# Patient Record
Sex: FEMALE | Race: WHITE | NOT HISPANIC OR LATINO | Employment: FULL TIME | URBAN - METROPOLITAN AREA
[De-identification: names, ages, dates, MRNs, and addresses within clinical notes are randomized per-mention and may not be internally consistent; named-entity substitution may affect disease eponyms.]

---

## 2017-03-27 PROBLEM — O14.03 MILD PRE-ECLAMPSIA IN THIRD TRIMESTER: Status: ACTIVE | Noted: 2017-03-27

## 2017-03-27 PROBLEM — O34.219 DECLINES VAGINAL BIRTH AFTER CESAREAN TRIAL: Status: ACTIVE | Noted: 2017-03-27

## 2017-06-26 PROBLEM — O14.03 MILD PRE-ECLAMPSIA IN THIRD TRIMESTER: Status: RESOLVED | Noted: 2017-03-27 | Resolved: 2017-06-26

## 2018-11-29 PROBLEM — O34.219 DECLINES VAGINAL BIRTH AFTER CESAREAN TRIAL: Status: RESOLVED | Noted: 2017-03-27 | Resolved: 2018-11-29

## 2020-02-28 PROBLEM — E66.811 CLASS 1 OBESITY DUE TO EXCESS CALORIES WITHOUT SERIOUS COMORBIDITY WITH BODY MASS INDEX (BMI) OF 30.0 TO 30.9 IN ADULT: Status: ACTIVE | Noted: 2020-02-28

## 2020-02-28 PROBLEM — E66.09 CLASS 1 OBESITY DUE TO EXCESS CALORIES WITHOUT SERIOUS COMORBIDITY WITH BODY MASS INDEX (BMI) OF 30.0 TO 30.9 IN ADULT: Status: ACTIVE | Noted: 2020-02-28

## 2022-02-23 PROBLEM — F41.8 DEPRESSION WITH ANXIETY: Status: ACTIVE | Noted: 2022-02-23

## 2022-12-24 PROBLEM — H02.403 PTOSIS, BILATERAL: Status: ACTIVE | Noted: 2022-12-24

## 2022-12-25 ENCOUNTER — HOSPITAL ENCOUNTER (INPATIENT)
Facility: HOSPITAL | Age: 37
LOS: 5 days | Discharge: NURSING, REHAB OR LONG-TERM CARE | DRG: 064 | End: 2022-12-30
Attending: EMERGENCY MEDICINE | Admitting: PSYCHIATRY & NEUROLOGY
Payer: COMMERCIAL

## 2022-12-25 DIAGNOSIS — I63.9 STROKE: ICD-10-CM

## 2022-12-25 DIAGNOSIS — I77.74 VERTEBRAL ARTERY DISSECTION: Primary | ICD-10-CM

## 2022-12-25 DIAGNOSIS — H53.2 DIPLOPIA: ICD-10-CM

## 2022-12-25 DIAGNOSIS — I63.9 CEREBROVASCULAR ACCIDENT (CVA) OF LEFT PONTINE STRUCTURE: ICD-10-CM

## 2022-12-25 DIAGNOSIS — H02.403 PTOSIS, BILATERAL: ICD-10-CM

## 2022-12-25 DIAGNOSIS — I63.9 NEW INFARCTION OF CEREBELLUM: ICD-10-CM

## 2022-12-25 PROBLEM — E66.09 CLASS 2 OBESITY DUE TO EXCESS CALORIES WITH BODY MASS INDEX (BMI) OF 37.0 TO 37.9 IN ADULT: Status: ACTIVE | Noted: 2022-12-25

## 2022-12-25 PROBLEM — E66.812 CLASS 2 OBESITY DUE TO EXCESS CALORIES WITH BODY MASS INDEX (BMI) OF 37.0 TO 37.9 IN ADULT: Status: ACTIVE | Noted: 2022-12-25

## 2022-12-25 LAB
APTT BLDCRRT: 25 SEC (ref 21–32)
ASCENDING AORTA: 2.69 CM
AV INDEX (PROSTH): 0.86
AV MEAN GRADIENT: 5 MMHG
AV PEAK GRADIENT: 8 MMHG
AV VALVE AREA: 2.45 CM2
AV VELOCITY RATIO: 0.87
BSA FOR ECHO PROCEDURE: 1.99 M2
CHOLEST SERPL-MCNC: 179 MG/DL (ref 120–199)
CHOLEST/HDLC SERPL: 3.2 {RATIO} (ref 2–5)
CK MB SERPL-MCNC: 1 NG/ML (ref 0.1–6.5)
CK MB SERPL-RTO: 0.6 % (ref 0–5)
CK SERPL-CCNC: 159 U/L (ref 20–180)
CV ECHO LV RWT: 0.22 CM
DOP CALC AO PEAK VEL: 1.37 M/S
DOP CALC AO VTI: 29.12 CM
DOP CALC LVOT AREA: 2.8 CM2
DOP CALC LVOT DIAMETER: 1.9 CM
DOP CALC LVOT PEAK VEL: 1.19 M/S
DOP CALC LVOT STROKE VOLUME: 71.3 CM3
DOP CALCLVOT PEAK VEL VTI: 25.16 CM
E WAVE DECELERATION TIME: 122.61 MSEC
E/A RATIO: 1.63
E/E' RATIO: 6.52 M/S
ECHO LV POSTERIOR WALL: 0.53 CM (ref 0.6–1.1)
EJECTION FRACTION: 65 %
ESTIMATED AVG GLUCOSE: 91 MG/DL (ref 68–131)
FRACTIONAL SHORTENING: 42 % (ref 28–44)
HBA1C MFR BLD: 4.8 % (ref 4–5.6)
HDLC SERPL-MCNC: 56 MG/DL (ref 40–75)
HDLC SERPL: 31.3 % (ref 20–50)
INR PPP: 1 (ref 0.8–1.2)
INTERVENTRICULAR SEPTUM: 0.7 CM (ref 0.6–1.1)
IVRT: 102.76 MSEC
LA MAJOR: 4.69 CM
LA MINOR: 4.81 CM
LA WIDTH: 3.08 CM
LDLC SERPL CALC-MCNC: 109.4 MG/DL (ref 63–159)
LEFT ATRIUM SIZE: 3.67 CM
LEFT ATRIUM VOLUME INDEX MOD: 17.1 ML/M2
LEFT ATRIUM VOLUME INDEX: 24 ML/M2
LEFT ATRIUM VOLUME MOD: 32.51 CM3
LEFT ATRIUM VOLUME: 45.63 CM3
LEFT INTERNAL DIMENSION IN SYSTOLE: 2.79 CM (ref 2.1–4)
LEFT VENTRICLE DIASTOLIC VOLUME INDEX: 56.12 ML/M2
LEFT VENTRICLE DIASTOLIC VOLUME: 106.62 ML
LEFT VENTRICLE MASS INDEX: 48 G/M2
LEFT VENTRICLE SYSTOLIC VOLUME INDEX: 15.4 ML/M2
LEFT VENTRICLE SYSTOLIC VOLUME: 29.2 ML
LEFT VENTRICULAR INTERNAL DIMENSION IN DIASTOLE: 4.78 CM (ref 3.5–6)
LEFT VENTRICULAR MASS: 90.34 G
LV LATERAL E/E' RATIO: 6.77 M/S
LV SEPTAL E/E' RATIO: 6.29 M/S
MV PEAK A VEL: 0.54 M/S
MV PEAK E VEL: 0.88 M/S
MV STENOSIS PRESSURE HALF TIME: 35.56 MS
MV VALVE AREA P 1/2 METHOD: 6.19 CM2
NONHDLC SERPL-MCNC: 123 MG/DL
PISA TR MAX VEL: 2.44 M/S
PROTHROMBIN TIME: 10.3 SEC (ref 9–12.5)
RA MAJOR: 4.34 CM
RA PRESSURE: 3 MMHG
RA WIDTH: 2.77 CM
RIGHT VENTRICULAR END-DIASTOLIC DIMENSION: 2.64 CM
RV TISSUE DOPPLER FREE WALL SYSTOLIC VELOCITY 1 (APICAL 4 CHAMBER VIEW): 16.54 CM/S
SINUS: 2.6 CM
STJ: 2.06 CM
TDI LATERAL: 0.13 M/S
TDI SEPTAL: 0.14 M/S
TDI: 0.14 M/S
TR MAX PG: 24 MMHG
TRICUSPID ANNULAR PLANE SYSTOLIC EXCURSION: 1.98 CM
TRIGL SERPL-MCNC: 68 MG/DL (ref 30–150)
TROPONIN I SERPL DL<=0.01 NG/ML-MCNC: <0.006 NG/ML (ref 0–0.03)
TSH SERPL DL<=0.005 MIU/L-ACNC: 1.32 UIU/ML (ref 0.4–4)
TV REST PULMONARY ARTERY PRESSURE: 27 MMHG

## 2022-12-25 PROCEDURE — 25000003 PHARM REV CODE 250: Performed by: NURSE PRACTITIONER

## 2022-12-25 PROCEDURE — 25000003 PHARM REV CODE 250: Performed by: PSYCHIATRY & NEUROLOGY

## 2022-12-25 PROCEDURE — 97530 THERAPEUTIC ACTIVITIES: CPT

## 2022-12-25 PROCEDURE — 99291 CRITICAL CARE FIRST HOUR: CPT | Mod: ,,, | Performed by: EMERGENCY MEDICINE

## 2022-12-25 PROCEDURE — 97535 SELF CARE MNGMENT TRAINING: CPT

## 2022-12-25 PROCEDURE — 85610 PROTHROMBIN TIME: CPT | Performed by: NURSE PRACTITIONER

## 2022-12-25 PROCEDURE — 25500020 PHARM REV CODE 255: Performed by: EMERGENCY MEDICINE

## 2022-12-25 PROCEDURE — 92610 EVALUATE SWALLOWING FUNCTION: CPT

## 2022-12-25 PROCEDURE — 99223 PR INITIAL HOSPITAL CARE,LEVL III: ICD-10-PCS | Mod: ,,, | Performed by: PSYCHIATRY & NEUROLOGY

## 2022-12-25 PROCEDURE — 99291 PR CRITICAL CARE, E/M 30-74 MINUTES: ICD-10-PCS | Mod: ,,, | Performed by: EMERGENCY MEDICINE

## 2022-12-25 PROCEDURE — 85730 THROMBOPLASTIN TIME PARTIAL: CPT | Performed by: NURSE PRACTITIONER

## 2022-12-25 PROCEDURE — 99223 1ST HOSP IP/OBS HIGH 75: CPT | Mod: ,,, | Performed by: PSYCHIATRY & NEUROLOGY

## 2022-12-25 PROCEDURE — 80061 LIPID PANEL: CPT | Performed by: NURSE PRACTITIONER

## 2022-12-25 PROCEDURE — 97165 OT EVAL LOW COMPLEX 30 MIN: CPT

## 2022-12-25 PROCEDURE — 99285 EMERGENCY DEPT VISIT HI MDM: CPT | Mod: 25

## 2022-12-25 PROCEDURE — 82553 CREATINE MB FRACTION: CPT | Performed by: NURSE PRACTITIONER

## 2022-12-25 PROCEDURE — 84484 ASSAY OF TROPONIN QUANT: CPT | Performed by: NURSE PRACTITIONER

## 2022-12-25 PROCEDURE — 63600175 PHARM REV CODE 636 W HCPCS: Performed by: NURSE PRACTITIONER

## 2022-12-25 PROCEDURE — 94761 N-INVAS EAR/PLS OXIMETRY MLT: CPT

## 2022-12-25 PROCEDURE — 84443 ASSAY THYROID STIM HORMONE: CPT | Performed by: NURSE PRACTITIONER

## 2022-12-25 PROCEDURE — 11000001 HC ACUTE MED/SURG PRIVATE ROOM

## 2022-12-25 PROCEDURE — 83036 HEMOGLOBIN GLYCOSYLATED A1C: CPT | Performed by: NURSE PRACTITIONER

## 2022-12-25 RX ORDER — LABETALOL HYDROCHLORIDE 5 MG/ML
10 INJECTION, SOLUTION INTRAVENOUS EVERY 6 HOURS PRN
Status: DISCONTINUED | OUTPATIENT
Start: 2022-12-25 | End: 2022-12-26

## 2022-12-25 RX ORDER — ALPRAZOLAM 0.25 MG/1
0.5 TABLET ORAL NIGHTLY PRN
Status: DISCONTINUED | OUTPATIENT
Start: 2022-12-25 | End: 2022-12-30 | Stop reason: HOSPADM

## 2022-12-25 RX ORDER — SODIUM CHLORIDE 0.9 % (FLUSH) 0.9 %
10 SYRINGE (ML) INJECTION
Status: DISCONTINUED | OUTPATIENT
Start: 2022-12-25 | End: 2022-12-30 | Stop reason: HOSPADM

## 2022-12-25 RX ORDER — ATORVASTATIN CALCIUM 20 MG/1
40 TABLET, FILM COATED ORAL DAILY
Status: DISCONTINUED | OUTPATIENT
Start: 2022-12-25 | End: 2022-12-30 | Stop reason: HOSPADM

## 2022-12-25 RX ORDER — ASPIRIN 81 MG/1
81 TABLET ORAL DAILY
Status: DISCONTINUED | OUTPATIENT
Start: 2022-12-25 | End: 2022-12-30 | Stop reason: HOSPADM

## 2022-12-25 RX ORDER — ENOXAPARIN SODIUM 100 MG/ML
40 INJECTION SUBCUTANEOUS EVERY 24 HOURS
Status: DISCONTINUED | OUTPATIENT
Start: 2022-12-25 | End: 2022-12-30 | Stop reason: HOSPADM

## 2022-12-25 RX ORDER — KETOROLAC TROMETHAMINE 30 MG/ML
10 INJECTION, SOLUTION INTRAMUSCULAR; INTRAVENOUS
Status: ACTIVE | OUTPATIENT
Start: 2022-12-25 | End: 2022-12-25

## 2022-12-25 RX ORDER — ACETAMINOPHEN 325 MG/1
650 TABLET ORAL EVERY 6 HOURS PRN
Status: DISCONTINUED | OUTPATIENT
Start: 2022-12-25 | End: 2022-12-30 | Stop reason: HOSPADM

## 2022-12-25 RX ORDER — SODIUM CHLORIDE 9 MG/ML
INJECTION, SOLUTION INTRAVENOUS CONTINUOUS
Status: DISCONTINUED | OUTPATIENT
Start: 2022-12-25 | End: 2022-12-29

## 2022-12-25 RX ORDER — ONDANSETRON 2 MG/ML
4 INJECTION INTRAMUSCULAR; INTRAVENOUS EVERY 12 HOURS PRN
Status: DISCONTINUED | OUTPATIENT
Start: 2022-12-25 | End: 2022-12-30 | Stop reason: HOSPADM

## 2022-12-25 RX ORDER — MUPIROCIN 20 MG/G
OINTMENT TOPICAL 2 TIMES DAILY
Status: COMPLETED | OUTPATIENT
Start: 2022-12-25 | End: 2022-12-29

## 2022-12-25 RX ORDER — MAGNESIUM SULFATE HEPTAHYDRATE 40 MG/ML
2 INJECTION, SOLUTION INTRAVENOUS ONCE
Status: DISCONTINUED | OUTPATIENT
Start: 2022-12-25 | End: 2022-12-28

## 2022-12-25 RX ORDER — METHOCARBAMOL 500 MG/1
500 TABLET, FILM COATED ORAL 3 TIMES DAILY PRN
Status: DISCONTINUED | OUTPATIENT
Start: 2022-12-25 | End: 2022-12-30 | Stop reason: HOSPADM

## 2022-12-25 RX ADMIN — SODIUM CHLORIDE: 9 INJECTION, SOLUTION INTRAVENOUS at 08:12

## 2022-12-25 RX ADMIN — MUPIROCIN: 20 OINTMENT TOPICAL at 11:12

## 2022-12-25 RX ADMIN — SODIUM CHLORIDE: 9 INJECTION, SOLUTION INTRAVENOUS at 06:12

## 2022-12-25 RX ADMIN — MUPIROCIN: 20 OINTMENT TOPICAL at 08:12

## 2022-12-25 RX ADMIN — ENOXAPARIN SODIUM 40 MG: 40 INJECTION SUBCUTANEOUS at 05:12

## 2022-12-25 RX ADMIN — IOHEXOL 100 ML: 350 INJECTION, SOLUTION INTRAVENOUS at 01:12

## 2022-12-25 RX ADMIN — ASPIRIN 81 MG: 81 TABLET, COATED ORAL at 03:12

## 2022-12-25 NOTE — SUBJECTIVE & OBJECTIVE
Past Medical History:   Diagnosis Date    Migraine headache      Past Surgical History:   Procedure Laterality Date     SECTION  2006    MANDIBLE FRACTURE SURGERY       Family History   Problem Relation Age of Onset    Ovarian cancer Mother 34    Hyperlipidemia Father     Ovarian cancer Maternal Aunt         age unknown    Breast cancer Paternal Grandmother         50's     Social History     Tobacco Use    Smoking status: Former     Types: Cigarettes     Quit date: 2013     Years since quittin.4    Smokeless tobacco: Never   Substance Use Topics    Alcohol use: Yes     Alcohol/week: 0.0 standard drinks     Comment: 2 time per week    Drug use: No     Review of patient's allergies indicates:   Allergen Reactions    Promethazine Other (See Comments)     Nervous reaction       Medications: I have reviewed the current medication administration record.    Medications Prior to Admission   Medication Sig Dispense Refill Last Dose    butalbital-acetaminophen-caffeine -40 mg (FIORICET, ESGIC) -40 mg per tablet Take 1 tablet by mouth every 12 (twelve) hours as needed for Pain or Headaches. 10 tablet 1     ondansetron (ZOFRAN-ODT) 8 MG TbDL LET 1 TABLET DISSOLVE TWICE A DAY AS NEEDED FOR NAUSEA 20 tablet 1        Review of Systems   Constitutional:  Negative for chills and fever.   HENT:  Negative for drooling and ear discharge.    Eyes:  Positive for visual disturbance. Negative for pain.   Respiratory:  Negative for cough and shortness of breath.    Cardiovascular:  Negative for chest pain and leg swelling.   Gastrointestinal:  Negative for abdominal distention and abdominal pain.   Endocrine: Negative for polydipsia and polyphagia.   Genitourinary:  Negative for dysuria and enuresis.   Musculoskeletal:  Negative for arthralgias and back pain.   Skin:  Negative for rash and wound.   Allergic/Immunologic: Negative for food allergies and immunocompromised state.   Neurological:  Positive for  speech difficulty, numbness and headaches.   Hematological:  Does not bruise/bleed easily.   Psychiatric/Behavioral:  Negative for agitation and confusion.    Objective:     Vital Signs (Most Recent):  Temp: 98.2 °F (36.8 °C) (12/25/22 0159)  Pulse: 94 (12/25/22 0316)  Resp: 14 (12/25/22 0316)  BP: 124/86 (12/25/22 0316)  SpO2: 97 % (12/25/22 0316)    Vital Signs Range (Last 24H):  Temp:  [98.2 °F (36.8 °C)-99.3 °F (37.4 °C)]   Pulse:  []   Resp:  [14-18]   BP: (124-167)/(62-92)   SpO2:  [97 %-100 %]     Physical Exam  Vitals and nursing note reviewed.   Constitutional:       Appearance: Normal appearance. She is obese.   HENT:      Head: Normocephalic and atraumatic.   Eyes:      Extraocular Movements: Extraocular movements intact.      Conjunctiva/sclera: Conjunctivae normal.      Pupils: Pupils are equal, round, and reactive to light.   Cardiovascular:      Rate and Rhythm: Normal rate and regular rhythm.   Pulmonary:      Effort: Pulmonary effort is normal.      Breath sounds: Normal breath sounds.   Abdominal:      General: Abdomen is flat. Bowel sounds are normal.      Palpations: Abdomen is soft.   Musculoskeletal:         General: Normal range of motion.      Cervical back: Normal range of motion.   Skin:     General: Skin is warm and dry.   Neurological:      Mental Status: She is alert.      Sensory: Sensory deficit present.      Comments: Dysarthria, ptosis     Psychiatric:         Mood and Affect: Mood normal.         Behavior: Behavior normal.       Neurological Exam:   LOC: alert  Attention Span: Good   Language: No aphasia  Articulation: Dysarthria  Orientation: Person, Place, Time   Visual Fields: Hemianopsia left  EOM (CN III, IV, VI): Ptosis left  Pupils (CN II, III): PERRL  Facial Sensation (CN V): Normal  Facial Movement (CN VII): Symmetric facial expression    Gag Reflex: present  Reflexes: flexor plantar responses bilaterally  Motor: Arm left  Normal 5/5  Leg left  Normal 5/5  Arm right   Normal 5/5  Leg right Normal 5/5  Cerebellum: Upper Extremity Appendicular Ataxia (Finger Nose Finger)  Left  Sensation: Govind-hypoesthesia left  Tone: Normal tone throughout      Laboratory:  CMP: No results for input(s): GLUCOSE, CALCIUM, ALBUMIN, PROT, NA, K, CO2, CL, BUN, CREATININE, ALKPHOS, ALT, AST, BILITOT in the last 24 hours.  CBC: No results for input(s): WBC, RBC, HGB, HCT, PLT, MCV, MCH, MCHC in the last 168 hours.  Lipid Panel:   Recent Labs   Lab 12/25/22  0346   CHOL 179   LDLCALC 109.4   HDL 56   TRIG 68     Coagulation:   Recent Labs   Lab 12/25/22 0346   INR 1.0   APTT 25.0     Hgb A1C:   Recent Labs   Lab 12/25/22 0346   HGBA1C 4.8     TSH:   Recent Labs   Lab 12/25/22 0346   TSH 1.318       Diagnostic Results:      Brain imaging:  CT Head w/o contrast 12-24-22 results:  No acute intracranial process identified    MRI Brain w/o contrast 12-25-22 results:  1.  Several apparent small subtle foci of restricted diffusion within the bilateral cerebellar hemispheres as well as additional punctate focus within the paramedian posterior right occipital lobe suspicious for punctate acute infarcts.  There is subtle asymmetric diffusion hyperintensity within the left govind lynn and additional region of acute ischemia not excluded.  Further evaluation and follow-up as warranted.     2.  Apparent small region of encephalomalacia within the left cerebellar hemisphere which may represent a remote infarct.  Additional possible punctate lacunar type infarct within the right superior cerebellar hemisphere.     3.  Abnormal T2 flow void involving the V3 segment of the right vertebral artery in this patient with suspected vertebral artery dissection.     4.  Several scattered foci of periventricular T2/FLAIR hyperintensity.  These lesions are nonspecific in appearance and may be seen with accelerated small vessel ischemic disease, vasculopathies, migraine headaches, and as the residua from inflammatory and traumatic  insults to the brain.     Preliminary findings were discussed with Ede GAINES by myself at 03:31 on 12/25/2022.    Vessel Imaging:  CTA Head and neck 12-25-22 results:  1.  Abnormal segment of focal caliber change with luminal narrowing and irregularity involving the V2 segment of the left vertebral artery.  Additionally, there is focal luminal narrowing and irregularity involving the V3 segment of the right vertebral artery.  Findings are concerning for possible bilateral vertebral artery dissection.  Vascular neurology consultation advised.  Further assessment could be performed with conventional angiography as warranted.     2.  Persistent asymmetric wedge-shaped region of parenchymal hypoattenuation within the left cerebellar hemisphere.  There is apparent contrast enhancement on the delayed series.  This is nonspecific although may relate to subacute infarct.  More sensitive assessment for ischemic could be performed with MRI if there are no patient contraindications.     3.  No evidence of cervical spine fracture.  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    Cardiac Evaluation:   EKG 12-24-22 results:  Normal sinus rhythm  Normal ECG  No previous ECGs available

## 2022-12-25 NOTE — HPI
36 y/o female who presented to the ED at [facility] evaluation of slurred speech.  She says that approximately 30 minutes prior to her arrival in the emergency department she cracked her neck as she frequently does but afterwards started having slurred speech as well as feeling like her left eye was drifting towards the midline.  She denies any double vision.  No headaches.  Says she has chronic neck pain.  No khanh extremity strength deficits.  She has been ambulatory.  Admits to drinking alcohol tonight, no other substances.  Does not think that her dysarthria is alcohol induced.  She was seen in telemedicine stroke by Dr Deluca and she presented within the window for IV thrombolysis; however, given concern for abnormal findings on her CTH, treatment was deferred.   Recommendation was to transfer to [facility] to be further evaluate with CTA head neck and them MRI brain.   Her symptoms are tongue numbness, ptosis, and dysarthria     CTA head and neck reveals CTA shows possible subacute left cerebellar infarct. CTA is concerning for short segment dissection of V2 segment on the left and the v3 segment on the right. 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  MRI Brain reveals Several small foci of restricted diffusion in the bilateral cerebellar hemispheres and the paramedian right occipital lobe concerning for punctate recent infarcts    Case discussed with Dr Rodgers Vascular Neurology Fellow and will admit to our service and place on ASA 81 mg only for now.

## 2022-12-25 NOTE — ASSESSMENT & PLAN NOTE
36 y/o female with 3 week history of headache and cracked her neck tonight and headache got worse plus she started with dysarthri, had Ptosis nina. Seen in [facility] ED and no thrombolytics due to concern for discestion so transferred to [facility] ED fopr further evaluation and CTA head and neck reveals Abnormal segment of focal caliber change with luminal narrowing and irregularity involving the V2 segment of the left vertebral artery.  Additionally, there is focal luminal narrowing and irregularity involving the V3 segment of the right vertebral artery. MRI Brain reveals Several apparent small subtle foci of restricted diffusion within the bilateral cerebellar hemispheres as well as additional punctate focus within the paramedian posterior right occipital lobe suspicious for punctate acute infarcts.  There is subtle asymmetric diffusion hyperintensity within the left lara lynn and additional region of acute ischemia not excluded.     Antithrombotics: ASA 81 mg daily    Statins: Lipitor 40 mg daily    Aggressive risk factor modification: Diet, Exercise, Obesity, cracking her neck almost daily     Rehab efforts: The patient has been evaluated by a stroke team provider and the therapy needs have been fully considered based off the presenting complaints and exam findings. The following therapy evaluations are needed: PT evaluate and treat, OT evaluate and treat, SLP evaluate and treat    Diagnostics ordered/pending: TTE to assess cardiac function/status     VTE prophylaxis: Enoxaparin 40 mg SQ every 24 hours  Mechanical prophylaxis: Place SCDs    BP parameters: Infarct: No intervention, SBP <220

## 2022-12-25 NOTE — PLAN OF CARE
Problem: SLP  Goal: SLP Goal  Description: Speech Language Pathology Goals  Goals expected to be met by 1/1/23    1.Pt will participate in ongoing assessment of swallow function to determine safest, least restrictive means of nutrition/hydration  2. Educate Pt and family on aspiration precautions and SLP POC  3. Pt will participate in further assessment of speech, language and cognition to determine ongoing POC needs   Outcome: Ongoing, Progressing     SLP Bedside Swallow Evaluation initiated. Patient presents with Oropharyngeal Dysphagia as c/b decreased lingual and labial coordination, decreased lingual sensation and overt coughing/choking with trials of thin liquids. Risk of aspiration remains 2/2 decreased oral phase and decreased sensation. REC: NPO and ST to continue to follow. Occasional single ice chip ok sparingly for comfort , provided strict precautions, to improve oral hygiene and salivation and reduce xerostomia.       12/25/2022

## 2022-12-25 NOTE — PT/OT/SLP EVAL
"Occupational Therapy   Evaluation    Name: Madeline Carlton Ma  MRN: [id number]  Admitting Diagnosis: Class 2 obesity due to excess calories with body mass index (BMI) of 37.0 to 37.9 in adult  Recent Surgery: * No surgery found *      Recommendations:     Discharge Recommendations: rehabilitation facility  Discharge Equipment Recommendations:  bedside commode, shower chair  Barriers to discharge:  None    Assessment:     Madeline Carlton Ma is a 37 y.o. female with a medical diagnosis of Class 2 obesity due to excess calories with body mass index (BMI) of 37.0 to 37.9 in adult.  She presents with performance deficits affecting function: weakness, impaired endurance, impaired self care skills, impaired functional mobility, impaired balance.      Rehab Prognosis: Good; patient would benefit from acute skilled OT services to address these deficits and reach maximum level of function.       Plan:     Patient to be seen 4 x/week to address the above listed problems via sensory integration, neuromuscular re-education, therapeutic exercises, therapeutic activities, self-care/home management  Plan of Care Expires: 01/23/23  Plan of Care Reviewed with: patient, spouse    Subjective     Patient:  "I feel wobbly like I am going to fall over."  "The right side of my tongue and inside of my mouth on the right is numb." "Can I closed my eyes now; that is hard work."  :  "She did amazing!  This is a lot better than when we came in."    Occupational Profile:  Patient resides in [place], [place] with her  and 2 sons ages 16 and 5 in 2 story home with bedroom on the 2nd floor; 1 step to enter.  Patient is right handed.  PTA patient independent with ADLs including driving.  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  Hobbies:  reading, TV, baking, cooking, caring for her pets.    Pain/Comfort:  Pain Rating 1: 0/10  Pain Rating Post-Intervention 1: 0/10    Patients cultural, spiritual, Buddhism conflicts given the current situation: " no    Objective:     Communicated with: Nurse prior to session.  Patient found supine with blood pressure cuff, telemetry, pulse ox (continuous), peripheral IV upon OT entry to room.    General Precautions: Standard, aspiration, fall  Orthopedic Precautions: N/A  Braces: N/A  Respiratory Status: Room air    Occupational Performance:    Bed Mobility:    Patient completed Rolling/Turning to Left with  modified independence  Patient completed Rolling/Turning to Right with modified independence  Patient completed Supine to Sit with modified independence  Patient completed Sit to Supine with modified independence    Functional Mobility/Transfers:  Patient completed Sit <> Stand Transfer with minimum assistance  with  no assistive device   Patient completed Bed <> Chair Transfer using Stand Pivot technique with minimum assistance with no assistive device    Activities of Daily Living:  Grooming: minimum assistance while standing  Upper Body Dressing: minimum assistance    Lower Body Dressing: minimum assistance      Cognitive/Visual Perceptual:  Cognitive/Psychosocial Skills:     -       Oriented to: Person, Place, Time, and Situation   -       Follows Commands/attention:Follows two-step commands  -       Communication: dysarthria  -       Safety awareness/insight to disability: intact   -       Mood/Affect/Coping skills/emotional control: Appropriate to situation and Cooperative  Vision:  Double vision; left eye not tracking medially; bilateral ptosis    Physical Exam:  Postural examination/scapula alignment:    -       Rounded shoulders  Skin integrity: Visible skin intact  Edema:  None noted  Upper Extremity Range of Motion:     -       Right Upper Extremity: WNL  -       Left Upper Extremity: WNL  Upper Extremity Strength:    -       Right Upper Extremity: WFL  -       Left Upper Extremity: WFL    AMPAC 6 Click ADL:  AMPAC Total Score: 18    Treatment & Education:  Patient/ Family education provided for stroke warning  signs, prevention guidelines and personal risk factors.  Patient/ Family verbalizing understanding via teach back method.    Patient education provided on role of OT and need for rehab upon discharge.  Family/patient verbalizing understanding via teach back method. Patient and family instructed on need to call for assistance for toileting needs and when getting up.    Continued education, patient/ family training recommended.    Patient's functional status and disposition recommendation discussed with stroke team in daily rounds.  White board updated in patient's room.  OT asked if there were any other questions; patient/ family had no further questions.    Patient left supine with all lines intact, call button in reach, and bed alarm on    GOALS:   Multidisciplinary Problems       Occupational Therapy Goals          Problem: Occupational Therapy    Goal Priority Disciplines Outcome Interventions   Occupational Therapy Goal     OT, PT/OT Ongoing, Progressing    Description: Goals set 12/25 to be addressed for 14 days with expiration date, 1/8:  Patient will increase functional independence with ADLs by performing:    Patient will demonstrate supine -sit with modified independence.   Not met  Patient will demonstrate stand pivot transfers with modified independence.   Not met  Patient will demonstrate grooming while standing with modified independence.   Not met  Patient will demonstrate upper body dressing with modified independence while seated EOB.   Not met  Patient will demonstrate lower body dressing with modified independence while seated EOB.   Not met  Patient will demonstrate toileting with modified independence.   Not met  Patient will demonstrate bathing while seated EOB with modified independence.   Not met  Patient's family / caregiver will demonstrate independence and safety with assisting patient with self-care skills and functional mobility.     Not met  Patient and/or patient's family will verbalize  understanding of stroke prevention guidelines, personal risk factors and stroke warning signs via teachback method.  Not met                                  History:     Past Medical History:   Diagnosis Date    Migraine headache          Past Surgical History:   Procedure Laterality Date     SECTION  2006    MANDIBLE FRACTURE SURGERY         Time Tracking:     OT Date of Treatment: 22  OT Start Time: 531  OT Stop Time: 549  OT Total Time (min): 18 min    Billable Minutes:Evaluation 10  Therapeutic Activity 8    2022

## 2022-12-25 NOTE — H&P
[facility] - Emergency Dept  Vascular Neurology  Comprehensive Stroke Center  History & Physical    Inpatient consult to Vascular (Stroke) Neurology  Consult performed by: Lacey Mera NP  Consult ordered by: Sherrie Anguiano MD        Assessment/Plan:     Patient is a 37 y.o. year old female with:    * Class 2 obesity due to excess calories with body mass index (BMI) of 37.0 to 37.9 in adult  Stroke risk factor   on diet and exercise    Vertebral artery dissection  See below stroke    New infarction of cerebellum  36 y/o female with 3 week history of headache and cracked her neck tonight and headache got worse plus she started with dysarthri, had Ptosis nina. Seen in [facility] ED and no thrombolytics due to concern for discestion so transferred to [facility] ED fopr further evaluation and CTA head and neck reveals Abnormal segment of focal caliber change with luminal narrowing and irregularity involving the V2 segment of the left vertebral artery.  Additionally, there is focal luminal narrowing and irregularity involving the V3 segment of the right vertebral artery. MRI Brain reveals Several apparent small subtle foci of restricted diffusion within the bilateral cerebellar hemispheres as well as additional punctate focus within the paramedian posterior right occipital lobe suspicious for punctate acute infarcts.  There is subtle asymmetric diffusion hyperintensity within the left lara lynn and additional region of acute ischemia not excluded.     Antithrombotics: ASA 81 mg daily    Statins: Lipitor 40 mg daily    Aggressive risk factor modification: Diet, Exercise, Obesity, cracking her neck almost daily     Rehab efforts: The patient has been evaluated by a stroke team provider and the therapy needs have been fully considered based off the presenting complaints and exam findings. The following therapy evaluations are needed: PT evaluate and treat, OT evaluate and treat, SLP evaluate and  treat    Diagnostics ordered/pending: TTE to assess cardiac function/status     VTE prophylaxis: Enoxaparin 40 mg SQ every 24 hours  Mechanical prophylaxis: Place SCDs    BP parameters: Infarct: No intervention, SBP <220        Ptosis, bilateral  Due to stroke        STROKE DOCUMENTATION     Acute Stroke Times   Last Known Normal Date: 12/24/22  Last Known Normal Time: 2200  Symptom Onset Date: 12/24/20  Symptom Onset Time: 2200  Stroke Team Called Date: 12/25/22  Stroke Team Called Time: 0124  Stroke Team Arrival Date: 12/25/20  Stroke Team Arrival Time: 0130  CT Interpretation Time: 0000  Thrombolytic Therapy Recommended: No  CTA Interpretation Time: 0152  Thrombectomy Recommended: No    NIH Scale:  1a. Level of Consciousness: 0-->Alert, keenly responsive  1b. LOC Questions: 0-->Answers both questions correctly  1c. LOC Commands: 0-->Performs both tasks correctly  2. Best Gaze: 1-->Partial gaze palsy, gaze is abnormal in one or both eyes, but forced deviation or total gaze paresis is not present  3. Visual: 1-->Partial hemianopia  4. Facial Palsy: 0-->Normal symmetrical movements  5a. Motor Arm, Left: 0-->No drift, limb holds 90 (or 45) degrees for full 10 secs  5b. Motor Arm, Right: 0-->No drift, limb holds 90 (or 45) degrees for full 10 secs  6a. Motor Leg, Left: 0-->No drift, leg holds 30 degree position for full 5 secs  6b. Motor Leg, Right: 0-->No drift, leg holds 30 degree position for full 5 secs  7. Limb Ataxia: 0-->Absent  8. Sensory: 1-->Mild-to-moderate sensory loss, patient feels pinprick is less sharp or is dull on the affected side, or there is a loss of superficial pain with pinprick, but patient is aware of being touched  9. Best Language: 0-->No aphasia, normal  10. Dysarthria: 1-->Mild-to-moderate dysarthria, patient slurs at least some words and, at worst, can be understood with some difficulty  11. Extinction and Inattention (formerly Neglect): 0-->No abnormality  Total (NIH Stroke Scale): 4      Modified Urmila Score: 0  Fox[place] Coma Scale:15   ABCD2 Score:    [id number] Score:   HAS -BLED Score:   ICH Score:   Hunt & Cali Classification:      Thrombolysis Candidate? No, Strong suspicion for stroke mimic or alternative diagnosis     Delays to Thrombolysis?  Not Applicable    Interventional Revascularization Candidate?   Is the patient eligible for mechanical endovascular reperfusion (JAYLYN)?  No; No large vessel occlusion identified on imaging     Delays to Thrombectomy? No    Hemorrhagic change of an Ischemic Stroke: Does this patient have an ischemic stroke with hemorrhagic changes? No         Subjective:     History of Present Illness:  38 y/o female who presented to the ED at [facility] evaluation of slurred speech.  She says that approximately 30 minutes prior to her arrival in the emergency department she cracked her neck as she frequently does but afterwards started having slurred speech as well as feeling like her left eye was drifting towards the midline.  She denies any double vision.  No headaches.  Says she has chronic neck pain.  No khanh extremity strength deficits.  She has been ambulatory.  Admits to drinking alcohol tonight, no other substances.  Does not think that her dysarthria is alcohol induced.  She was seen in telemedicine stroke by Dr Deluca and she presented within the window for IV thrombolysis; however, given concern for abnormal findings on her CTH, treatment was deferred.   Recommendation was to transfer to [facility] to be further evaluate with CTA head neck and them MRI brain.   Her symptoms are tongue numbness, ptosis, and dysarthria     CTA head and neck reveals CTA shows possible subacute left cerebellar infarct. CTA is concerning for short segment dissection of V2 segment on the left and the v3 segment on the right. 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  MRI Brain reveals Several small foci  of restricted diffusion in the bilateral cerebellar hemispheres and the paramedian right occipital lobe concerning for punctate recent infarcts    Case discussed with Dr Rodgers Vascular Neurology Fellow and will admit to our service and place on ASA 81 mg only for now.              Past Medical History:   Diagnosis Date    Migraine headache      Past Surgical History:   Procedure Laterality Date     SECTION  2006    MANDIBLE FRACTURE SURGERY       Family History   Problem Relation Age of Onset    Ovarian cancer Mother 34    Hyperlipidemia Father     Ovarian cancer Maternal Aunt         age unknown    Breast cancer Paternal Grandmother         50's     Social History     Tobacco Use    Smoking status: Former     Types: Cigarettes     Quit date: 2013     Years since quittin.4    Smokeless tobacco: Never   Substance Use Topics    Alcohol use: Yes     Alcohol/week: 0.0 standard drinks     Comment: 2 time per week    Drug use: No     Review of patient's allergies indicates:   Allergen Reactions    Promethazine Other (See Comments)     Nervous reaction       Medications: I have reviewed the current medication administration record.    Medications Prior to Admission   Medication Sig Dispense Refill Last Dose    butalbital-acetaminophen-caffeine -40 mg (FIORICET, ESGIC) -40 mg per tablet Take 1 tablet by mouth every 12 (twelve) hours as needed for Pain or Headaches. 10 tablet 1     ondansetron (ZOFRAN-ODT) 8 MG TbDL LET 1 TABLET DISSOLVE TWICE A DAY AS NEEDED FOR NAUSEA 20 tablet 1        Review of Systems   Constitutional:  Negative for chills and fever.   HENT:  Negative for drooling and ear discharge.    Eyes:  Positive for visual disturbance. Negative for pain.   Respiratory:  Negative for cough and shortness of breath.    Cardiovascular:  Negative for chest pain and leg swelling.   Gastrointestinal:  Negative for abdominal distention and abdominal pain.   Endocrine: Negative  for polydipsia and polyphagia.   Genitourinary:  Negative for dysuria and enuresis.   Musculoskeletal:  Negative for arthralgias and back pain.   Skin:  Negative for rash and wound.   Allergic/Immunologic: Negative for food allergies and immunocompromised state.   Neurological:  Positive for speech difficulty, numbness and headaches.   Hematological:  Does not bruise/bleed easily.   Psychiatric/Behavioral:  Negative for agitation and confusion.    Objective:     Vital Signs (Most Recent):  Temp: 98.2 °F (36.8 °C) (12/25/22 0159)  Pulse: 94 (12/25/22 0316)  Resp: 14 (12/25/22 0316)  BP: 124/86 (12/25/22 0316)  SpO2: 97 % (12/25/22 0316)    Vital Signs Range (Last 24H):  Temp:  [98.2 °F (36.8 °C)-99.3 °F (37.4 °C)]   Pulse:  []   Resp:  [14-18]   BP: (124-167)/(62-92)   SpO2:  [97 %-100 %]     Physical Exam  Vitals and nursing note reviewed.   Constitutional:       Appearance: Normal appearance. She is obese.   HENT:      Head: Normocephalic and atraumatic.   Eyes:      Extraocular Movements: Extraocular movements intact.      Conjunctiva/sclera: Conjunctivae normal.      Pupils: Pupils are equal, round, and reactive to light.   Cardiovascular:      Rate and Rhythm: Normal rate and regular rhythm.   Pulmonary:      Effort: Pulmonary effort is normal.      Breath sounds: Normal breath sounds.   Abdominal:      General: Abdomen is flat. Bowel sounds are normal.      Palpations: Abdomen is soft.   Musculoskeletal:         General: Normal range of motion.      Cervical back: Normal range of motion.   Skin:     General: Skin is warm and dry.   Neurological:      Mental Status: She is alert.      Sensory: Sensory deficit present.      Comments: Dysarthria, ptosis     Psychiatric:         Mood and Affect: Mood normal.         Behavior: Behavior normal.       Neurological Exam:   LOC: alert  Attention Span: Good   Language: No aphasia  Articulation: Dysarthria  Orientation: Person, Place, Time   Visual Fields:  Hemianopsia left  EOM (CN III, IV, VI): Ptosis left  Pupils (CN II, III): PERRL  Facial Sensation (CN V): Normal  Facial Movement (CN VII): Symmetric facial expression    Gag Reflex: present  Reflexes: flexor plantar responses bilaterally  Motor: Arm left  Normal 5/5  Leg left  Normal 5/5  Arm right  Normal 5/5  Leg right Normal 5/5  Cerebellum: Upper Extremity Appendicular Ataxia (Finger Nose Finger)  Left  Sensation: Govind-hypoesthesia left  Tone: Normal tone throughout      Laboratory:  CMP: No results for input(s): GLUCOSE, CALCIUM, ALBUMIN, PROT, NA, K, CO2, CL, BUN, CREATININE, ALKPHOS, ALT, AST, BILITOT in the last 24 hours.  CBC: No results for input(s): WBC, RBC, HGB, HCT, PLT, MCV, MCH, MCHC in the last 168 hours.  Lipid Panel:   Recent Labs   Lab 12/25/22  0346   CHOL 179   LDLCALC 109.4   HDL 56   TRIG 68     Coagulation:   Recent Labs   Lab 12/25/22  0346   INR 1.0   APTT 25.0     Hgb A1C:   Recent Labs   Lab 12/25/22  0346   HGBA1C 4.8     TSH:   Recent Labs   Lab 12/25/22  0346   TSH 1.318       Diagnostic Results:      Brain imaging:  CT Head w/o contrast 12-24-22 results:  No acute intracranial process identified    MRI Brain w/o contrast 12-25-22 results:  1.  Several apparent small subtle foci of restricted diffusion within the bilateral cerebellar hemispheres as well as additional punctate focus within the paramedian posterior right occipital lobe suspicious for punctate acute infarcts.  There is subtle asymmetric diffusion hyperintensity within the left govind lynn and additional region of acute ischemia not excluded.  Further evaluation and follow-up as warranted.     2.  Apparent small region of encephalomalacia within the left cerebellar hemisphere which may represent a remote infarct.  Additional possible punctate lacunar type infarct within the right superior cerebellar hemisphere.     3.  Abnormal T2 flow void involving the V3 segment of the right vertebral artery in this patient with  suspected vertebral artery dissection.     4.  Several scattered foci of periventricular T2/FLAIR hyperintensity.  These lesions are nonspecific in appearance and may be seen with accelerated small vessel ischemic disease, vasculopathies, migraine headaches, and as the residua from inflammatory and traumatic insults to the brain.     Preliminary findings were discussed with Ede GAINES by myself at 03:31 on 12/25/2022.    Vessel Imaging:  CTA Head and neck 12-25-22 results:  1.  Abnormal segment of focal caliber change with luminal narrowing and irregularity involving the V2 segment of the left vertebral artery.  Additionally, there is focal luminal narrowing and irregularity involving the V3 segment of the right vertebral artery.  Findings are concerning for possible bilateral vertebral artery dissection.  Vascular neurology consultation advised.  Further assessment could be performed with conventional angiography as warranted.     2.  Persistent asymmetric wedge-shaped region of parenchymal hypoattenuation within the left cerebellar hemisphere.  There is apparent contrast enhancement on the delayed series.  This is nonspecific although may relate to subacute infarct.  More sensitive assessment for ischemic could be performed with MRI if there are no patient contraindications.     3.  No evidence of cervical spine fracture.  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    Cardiac Evaluation:   EKG 12-24-22 results:  Normal sinus rhythm  Normal ECG  No previous ECGs available          Lacey Mera NP  [facility]  Department of Vascular Neurology   [facility] - Emergency Dept

## 2022-12-25 NOTE — ED PROVIDER NOTES
"Source of History:  Patient  Chart    Chief complaint:  transfer (From [facility] for [facility] neuro consuly)      HPI:  Madeline Carlton Ma is a 37 y.o. female with history of recent abnormal CT head presenting as transfer from outside hospital for vascular Neurology evaluation.      Patient states that she cracked her neck shortly prior to arrival at outside hospital.  Immediately, she developed difficulty with speech, as well as difficulty moving her eyes.  She also had perioral numbness.    Per chart review, CT head with and without contrast on 12/21/2022 showed Wedge-shaped 2.2 x 1.0 by 0.8 cm focus in the left cerebellum laterally with 2nd similar focus 0.5 cm similar focus ventral to it both questionable minimal patchy enhancement versus just a blood vessels passing through/around them.  These foci are of unknown etiology, but differential considerations include encephalomalacia from chronic infarctions with the without some patchy enhancement from luxury perfusion, sequela from remote trauma, and benign and malignant masses.  Recommend an MRI of the brain with and without contrast for further evaluation."    ROS: As per HPI and below:  Review of Systems   Constitutional:  Negative for fever.   HENT:  Negative for sore throat.    Eyes:  Negative for double vision.   Respiratory:  Negative for cough and shortness of breath.    Cardiovascular:  Negative for chest pain.   Gastrointestinal:  Negative for abdominal pain and vomiting.   Genitourinary:  Negative for dysuria.   Musculoskeletal:  Positive for neck pain. Negative for falls.   Skin:  Negative for rash.   Neurological:  Positive for sensory change, speech change, focal weakness and headaches.     Review of patient's allergies indicates:   Allergen Reactions    Promethazine Other (See Comments)     Nervous reaction       Current Facility-Administered Medications on File Prior to Encounter   Medication Dose Route Frequency Provider Last Rate Last Admin    " [DISCONTINUED] iohexoL (OMNIPAQUE 350) injection 70 mL  70 mL Intravenous ONCE PRN Jose Jett MD         Current Outpatient Medications on File Prior to Encounter   Medication Sig Dispense Refill    butalbital-acetaminophen-caffeine -40 mg (FIORICET, ESGIC) -40 mg per tablet Take 1 tablet by mouth every 12 (twelve) hours as needed for Pain or Headaches. 10 tablet 1    ondansetron (ZOFRAN-ODT) 8 MG TbDL LET 1 TABLET DISSOLVE TWICE A DAY AS NEEDED FOR NAUSEA 20 tablet 1       PMH:  As per HPI and below:  Past Medical History:   Diagnosis Date    Migraine headache      Past Surgical History:   Procedure Laterality Date     SECTION  2006    MANDIBLE FRACTURE SURGERY         Social History     Socioeconomic History    Marital status:    Tobacco Use    Smoking status: Former     Types: Cigarettes     Quit date: 2013     Years since quittin.4    Smokeless tobacco: Never   Substance and Sexual Activity    Alcohol use: Yes     Alcohol/week: 0.0 standard drinks     Comment: 2 time per week    Drug use: No    Sexual activity: Yes     Social Determinants of Health     Financial Resource Strain: Low Risk     Difficulty of Paying Living Expenses: Not hard at all   Food Insecurity: No Food Insecurity    Worried About Running Out of Food in the Last Year: Never true    Ran Out of Food in the Last Year: Never true   Transportation Needs: No Transportation Needs    Lack of Transportation (Medical): No    Lack of Transportation (Non-Medical): No   Physical Activity: Insufficiently Active    Days of Exercise per Week: 6 days    Minutes of Exercise per Session: 20 min   Stress: No Stress Concern Present    Feeling of Stress : Not at all   Social Connections: Unknown    Frequency of Communication with Friends and Family: Never    Frequency of Social Gatherings with Friends and Family: Twice a week    Active Member of Clubs or Organizations: No    Attends Club or Organization Meetings: Patient  refused    Marital Status:    Housing Stability: Low Risk     Unable to Pay for Housing in the Last Year: No    Number of Places Lived in the Last Year: 1    Unstable Housing in the Last Year: No       Family History   Problem Relation Age of Onset    Ovarian cancer Mother 34    Hyperlipidemia Father     Ovarian cancer Maternal Aunt         age unknown    Breast cancer Paternal Grandmother         50's       Physical Exam:      Vitals:    12/25/22 1905   BP: (!) 140/74   Pulse: 73   Resp: 16   Temp: 99.6 °F (37.6 °C)     Gen: No acute distress.  Nontoxic.  Well appearing.  Mental Status:  Alert and oriented .  Appropriate, conversant.  Skin: Warm, dry. No rashes seen.  Eyes: No conjunctival injection.  Pulm: CTAB. No increased work of breathing.  No significant tachypnea.  No audible stridor or wheezing.  No conversational dyspnea.    CV: Regular rate. Regular rhythm.   Abd: Soft.  Not distended.  Nontender.   MSK: Good range of motion all joints.  No deformities.    Neuro: Awake.  Dysarthria present.  Dysconjugate gaze present.       Laboratory Studies:  Labs Reviewed   LIPID PANEL    Narrative:     Fasting   HEMOGLOBIN A1C    Narrative:     Fasting   TSH    Narrative:     Fasting   TROPONIN I    Narrative:     Fasting   CK-MB    Narrative:     Fasting   APTT    Narrative:     Fasting   PROTIME-INR    Narrative:     Fasting         Chart reviewed.     Imaging Results               MRI Brain Without Contrast (Final result)  Result time 12/25/22 03:32:15      Final result by Wilson Núñez MD (12/25/22 03:32:15)                   Impression:      1.  Several apparent small subtle foci of restricted diffusion within the bilateral cerebellar hemispheres as well as additional punctate focus within the paramedian posterior right occipital lobe suspicious for punctate acute infarcts.  There is subtle asymmetric diffusion hyperintensity within the left lara lynn and additional region of acute ischemia not  excluded.  Further evaluation and follow-up as warranted.    2.  Apparent small region of encephalomalacia within the left cerebellar hemisphere which may represent a remote infarct.  Additional possible punctate lacunar type infarct within the right superior cerebellar hemisphere.    3.  Abnormal T2 flow void involving the V3 segment of the right vertebral artery in this patient with suspected vertebral artery dissection.    4.  Several scattered foci of periventricular T2/FLAIR hyperintensity.  These lesions are nonspecific in appearance and may be seen with accelerated small vessel ischemic disease, vasculopathies, migraine headaches, and as the residua from inflammatory and traumatic insults to the brain.    Preliminary findings were discussed with Ede GAINES by myself at 03:31 on 12/25/2022.    This report was flagged in Epic as abnormal.    Electronically signed by resident: Juan Jimenez  Date:    12/25/2022  Time:    02:46    Electronically signed by: Wilson Núñez MD  Date:    12/25/2022  Time:    03:32               Narrative:    EXAMINATION:  MRI BRAIN WITHOUT CONTRAST    CLINICAL HISTORY:  headache, dysarthria;    TECHNIQUE:  Multiplanar multisequence MR imaging of the brain was performed without intravenous contrast.    COMPARISON:  CT head 12/24/2022    FINDINGS:  There are several suspected small subtle foci of restricted diffusion within the bilateral cerebellar hemispheres suspicious for punctate acute infarcts (for example series 6, image 32 and 34).  There is an additional punctate focus of restricted diffusion within the paramedian posterior right occipital lobe suspicious for punctate acute infarct (series 6, image 34).  There is subtle asymmetric diffusion hyperintensity within the left lara lynn and additional region of acute ischemia not excluded.    There is and apparent small region of encephalomalacia within the left cerebellar hemisphere which may represent a remote infarct.  There is a  possible punctate lacunar type infarct within the right superior cerebellar hemisphere (series 7, image 7).  There are a few scattered foci of periventricular T2/FLAIR hyperintensity.  There is no midline shift or hydrocephalus.  No extra-axial collections identified.  No gradient signal abnormality identified.    The sellar region is unremarkable. The craniocervical junction is within normal limits. The globes and orbits appear unremarkable. The T2 flow void of the V3 similar vertebral arteries not well delineated patient with suspected vertebral artery dissection.  The remaining intracranial T2 flow voids are present.                                        CTA Head and Neck (xpd) (Final result)  Result time 12/25/22 03:00:02      Final result by Wilson Núñez MD (12/25/22 03:00:02)                   Impression:      1.  Abnormal segment of focal caliber change with luminal narrowing and irregularity involving the V2 segment of the left vertebral artery.  Additionally, there is focal luminal narrowing and irregularity involving the V3 segment of the right vertebral artery.  Findings are concerning for possible bilateral vertebral artery dissection.  Vascular neurology consultation advised.  Further assessment could be performed with conventional angiography as warranted.    2.  Persistent asymmetric wedge-shaped region of parenchymal hypoattenuation within the left cerebellar hemisphere.  There is apparent contrast enhancement on the delayed series.  This is nonspecific although may relate to subacute infarct.  More sensitive assessment for ischemic could be performed with MRI if there are no patient contraindications.    3.  No evidence of cervical spine fracture.  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    4.  Additional findings as above.    Preliminary findings were discussed with Dr. Anguiano by myself at 02:27 on 12/25/2022.    This report was flagged in Epic as  abnormal.      Electronically signed by: Wilson Núñez MD  Date:    12/25/2022  Time:    03:00               Narrative:    EXAMINATION:  CTA HEAD AND NECK (XPD)    CLINICAL HISTORY:  Neuro deficit, acute, stroke suspected;Suspected dissection;    TECHNIQUE:  Non contrast low dose axial images were obtained through the head.  CT angiogram was performed from the level of the nestor to the top of the head following the IV administration of 100mL of Omnipaque 350.   Sagittal and coronal reconstructions and maximum intensity projection reconstructions were performed. Arterial stenosis percentages are based on NASCET measurement criteria.    CT source data was analyzed using artificial intelligence offer for detection of a large vessel occlusion (LVO) in order to enable computer-assisted triage notification and 8 clinical stroke decision-making.    COMPARISON:  CT head 12/24/2022, 12/22/2022    FINDINGS:  Intracranial Compartment:    There is a persistent asymmetric wedge-shaped region of parenchymal hypoattenuation extending to the periphery of the left cerebellar hemisphere (axial series 2, image 10), noting assessment of posterior fossa is somewhat limited by streak artifact.  No definite associated hemorrhage or significant localized mass effect appreciated.  The remaining parenchyma demonstrates no definite new large region of abnormal parenchymal hypoattenuation.  No definite acute intracranial hemorrhage appreciated.  There is no midline shift.  There is no hydrocephalus.  No extra-axial collections identified.    Skull/Extracranial Contents (limited evaluation): No fracture.  There are partially visualized postsurgical changes of the nasal bones.  Mastoid air cells and paranasal sinuses are essentially clear.    Aorta: Shared origin of the right brachiocephalic and left common carotid arteries.    Extracranial carotid circulation: No hemodynamically significant stenosis, aneurysmal dilatation, or  dissection.    Extracranial vertebral circulation: The left vertebral artery demonstrates an abnormal approximate 2.5 cm length segment of focal caliber change with luminal narrowing and irregularity.  This involves the distal V2 segment beginning at approximately the level of C4 and return to relatively normal caliber at the level of the V3 segment.  The more distal left V3 and V4 segments appear patent.    The right vertebral artery demonstrates abnormal appearance of the V3 segment demonstrating abrupt caliber change and narrowing with luminal irregularity, noting significant narrowing of the V3 segment (for example series 5, image 303).  The right V4 segment appears patent.    Intracranial Arteries: The intracranial portion of the bilateral ICAs appears patent without evidence high-grade stenosis or occlusion.  The bilateral MCAs and ACAs appear patent.    The basilar artery is patent.  Bilateral PCAs appear patent.  There is a small left-sided posterior communicating artery.    Venous structures (limited evaluation): The major dural venous structures appear patent.    On the delayed phase, there is apparent enhancement corresponding to region of left cerebellar parenchymal hypoattenuation on the noncontrast portion (axial series 8, image 8), may relate to subacute infarct.    Non-Vascular Structures of the Neck/Thoracic Inlet (limited evaluation): The parotid and submandibular glands appear within normal limits.  Thyroid gland is unremarkable.  The trachea is midline and patent.  Lungs appear well aerated.  There are calcified left hilar granuloma.  There is a calcified left upper lobe granuloma.    No definite displaced cervical spine fracture appreciated.  There is presumed congenital osseous fusion of the C2 and C3 vertebral bodies.                                      Medications Given:  Medications   magnesium sulfate 2g in water 50mL IVPB (premix) (0 g Intravenous Hold 12/25/22 1235)   ketorolac injection  9.999 mg (0 mg Intravenous Hold 12/25/22 0230)   sodium chloride 0.9% flush 10 mL (has no administration in time range)   sodium chloride 0.9% bolus 500 mL 500 mL (has no administration in time range)   labetaloL injection 10 mg (has no administration in time range)   0.9%  NaCl infusion ( Intravenous New Bag 12/25/22 0658)   enoxaparin injection 40 mg (40 mg Subcutaneous Given 12/25/22 1717)   acetaminophen tablet 650 mg (has no administration in time range)   aspirin EC tablet 81 mg (81 mg Oral Given 12/25/22 0351)   atorvastatin tablet 40 mg (40 mg Oral Not Given 12/25/22 0900)   ondansetron injection 4 mg (has no administration in time range)   mupirocin 2 % ointment ( Nasal Given 12/25/22 1144)   methocarbamoL tablet 500 mg (has no administration in time range)   ALPRAZolam tablet 0.5 mg (has no administration in time range)   iohexoL (OMNIPAQUE 350) injection 100 mL (100 mLs Intravenous Given 12/25/22 0149)       Discussed with:  Vascular Neurology    MDM:    37 y.o. female transferred from outside hospital for neuro deficits and neck pain after cracking her neck.  Start CTA demonstrates vertebral artery dissection.  Will give aspirin.  Headache has resolved.  Case was discussed with vascular Neurology and admitted to their service.    Critical Care Procedure Note    Authorized and Performed by: Sherrie Anguiano    Total critical care time: Approximately 34 minutes    Due to a high probability of clinically significant, life threatening deterioration, the patient required my highest level of preparedness to intervene emergently and I personally spent this critical care time directly and personally managing the patient. This critical care time included obtaining a history; examining the patient; pulse oximetry; ordering and review of studies; arranging urgent treatment with development of a management plan; evaluation of patient's response to treatment; frequent reassessment; and, discussions with other  providers.    This critical care time was performed to assess and manage the high probability of imminent, life-threatening deterioration that could result in multi-organ failure. It was exclusive of separately billable procedures and treating other patients and teaching time.    Diagnostic Impression:    1. Vertebral artery dissection    2. Stroke         ED Disposition Condition    Admit Stable               Patient and family understands the plan and is in agreement, verbalized understanding, questions answered    Sherrie Anguiano MD  Emergency Medicine         Sherrie Anguiano MD  12/25/22 6014

## 2022-12-25 NOTE — PLAN OF CARE
OT evaluation completed.  Problem: Occupational Therapy  Goal: Occupational Therapy Goal  Description: Goals set 12/25 to be addressed for 14 days with expiration date, 1/8:  Patient will increase functional independence with ADLs by performing:    Patient will demonstrate supine -sit with modified independence.   Not met  Patient will demonstrate stand pivot transfers with modified independence.   Not met  Patient will demonstrate grooming while standing with modified independence.   Not met  Patient will demonstrate upper body dressing with modified independence while seated EOB.   Not met  Patient will demonstrate lower body dressing with modified independence while seated EOB.   Not met  Patient will demonstrate toileting with modified independence.   Not met  Patient will demonstrate bathing while seated EOB with modified independence.   Not met  Patient's family / caregiver will demonstrate independence and safety with assisting patient with self-care skills and functional mobility.     Not met  Patient and/or patient's family will verbalize understanding of stroke prevention guidelines, personal risk factors and stroke warning signs via teachback method.  Not met             Outcome: Ongoing, Progressing

## 2022-12-26 LAB
ALBUMIN SERPL BCP-MCNC: 3.5 G/DL (ref 3.5–5.2)
ALP SERPL-CCNC: 76 U/L (ref 55–135)
ALT SERPL W/O P-5'-P-CCNC: 23 U/L (ref 10–44)
ANION GAP SERPL CALC-SCNC: 8 MMOL/L (ref 8–16)
AST SERPL-CCNC: 27 U/L (ref 10–40)
BASOPHILS # BLD AUTO: 0.02 K/UL (ref 0–0.2)
BASOPHILS NFR BLD: 0.3 % (ref 0–1.9)
BILIRUB SERPL-MCNC: 0.5 MG/DL (ref 0.1–1)
BUN SERPL-MCNC: 11 MG/DL (ref 6–20)
CALCIUM SERPL-MCNC: 8.2 MG/DL (ref 8.7–10.5)
CHLORIDE SERPL-SCNC: 111 MMOL/L (ref 95–110)
CO2 SERPL-SCNC: 21 MMOL/L (ref 23–29)
CREAT SERPL-MCNC: 0.8 MG/DL (ref 0.5–1.4)
DIFFERENTIAL METHOD: ABNORMAL
EOSINOPHIL # BLD AUTO: 0.1 K/UL (ref 0–0.5)
EOSINOPHIL NFR BLD: 1.1 % (ref 0–8)
ERYTHROCYTE [DISTWIDTH] IN BLOOD BY AUTOMATED COUNT: 14.4 % (ref 11.5–14.5)
EST. GFR  (NO RACE VARIABLE): >60 ML/MIN/1.73 M^2
GLUCOSE SERPL-MCNC: 82 MG/DL (ref 70–110)
HCT VFR BLD AUTO: 42.2 % (ref 37–48.5)
HGB BLD-MCNC: 13.2 G/DL (ref 12–16)
IMM GRANULOCYTES # BLD AUTO: 0.01 K/UL (ref 0–0.04)
IMM GRANULOCYTES NFR BLD AUTO: 0.2 % (ref 0–0.5)
LYMPHOCYTES # BLD AUTO: 2 K/UL (ref 1–4.8)
LYMPHOCYTES NFR BLD: 32.1 % (ref 18–48)
MAGNESIUM SERPL-MCNC: 1.9 MG/DL (ref 1.6–2.6)
MCH RBC QN AUTO: 28.9 PG (ref 27–31)
MCHC RBC AUTO-ENTMCNC: 31.3 G/DL (ref 32–36)
MCV RBC AUTO: 92 FL (ref 82–98)
MONOCYTES # BLD AUTO: 0.5 K/UL (ref 0.3–1)
MONOCYTES NFR BLD: 7.4 % (ref 4–15)
NEUTROPHILS # BLD AUTO: 3.7 K/UL (ref 1.8–7.7)
NEUTROPHILS NFR BLD: 58.9 % (ref 38–73)
NRBC BLD-RTO: 0 /100 WBC
PHOSPHATE SERPL-MCNC: 2.1 MG/DL (ref 2.7–4.5)
PLATELET # BLD AUTO: 201 K/UL (ref 150–450)
PMV BLD AUTO: 12.1 FL (ref 9.2–12.9)
POTASSIUM SERPL-SCNC: 4 MMOL/L (ref 3.5–5.1)
PROT SERPL-MCNC: 6.8 G/DL (ref 6–8.4)
RBC # BLD AUTO: 4.57 M/UL (ref 4–5.4)
SODIUM SERPL-SCNC: 140 MMOL/L (ref 136–145)
WBC # BLD AUTO: 6.32 K/UL (ref 3.9–12.7)

## 2022-12-26 PROCEDURE — 97116 GAIT TRAINING THERAPY: CPT

## 2022-12-26 PROCEDURE — 36415 COLL VENOUS BLD VENIPUNCTURE: CPT | Performed by: NURSE PRACTITIONER

## 2022-12-26 PROCEDURE — 11000001 HC ACUTE MED/SURG PRIVATE ROOM

## 2022-12-26 PROCEDURE — 80053 COMPREHEN METABOLIC PANEL: CPT | Performed by: NURSE PRACTITIONER

## 2022-12-26 PROCEDURE — 83735 ASSAY OF MAGNESIUM: CPT | Performed by: NURSE PRACTITIONER

## 2022-12-26 PROCEDURE — 25000003 PHARM REV CODE 250: Performed by: NURSE PRACTITIONER

## 2022-12-26 PROCEDURE — 97162 PT EVAL MOD COMPLEX 30 MIN: CPT

## 2022-12-26 PROCEDURE — 99233 SBSQ HOSP IP/OBS HIGH 50: CPT | Mod: ,,, | Performed by: PSYCHIATRY & NEUROLOGY

## 2022-12-26 PROCEDURE — 97112 NEUROMUSCULAR REEDUCATION: CPT

## 2022-12-26 PROCEDURE — 63600175 PHARM REV CODE 636 W HCPCS: Performed by: NURSE PRACTITIONER

## 2022-12-26 PROCEDURE — 84100 ASSAY OF PHOSPHORUS: CPT | Performed by: NURSE PRACTITIONER

## 2022-12-26 PROCEDURE — 85025 COMPLETE CBC W/AUTO DIFF WBC: CPT | Performed by: NURSE PRACTITIONER

## 2022-12-26 PROCEDURE — 99233 PR SUBSEQUENT HOSPITAL CARE,LEVL III: ICD-10-PCS | Mod: ,,, | Performed by: PSYCHIATRY & NEUROLOGY

## 2022-12-26 PROCEDURE — 97535 SELF CARE MNGMENT TRAINING: CPT

## 2022-12-26 RX ORDER — LABETALOL HYDROCHLORIDE 5 MG/ML
10 INJECTION, SOLUTION INTRAVENOUS EVERY 6 HOURS PRN
Status: DISCONTINUED | OUTPATIENT
Start: 2022-12-26 | End: 2022-12-30 | Stop reason: HOSPADM

## 2022-12-26 RX ADMIN — ENOXAPARIN SODIUM 40 MG: 40 INJECTION SUBCUTANEOUS at 04:12

## 2022-12-26 RX ADMIN — ASPIRIN 81 MG: 81 TABLET, COATED ORAL at 08:12

## 2022-12-26 RX ADMIN — SODIUM CHLORIDE: 9 INJECTION, SOLUTION INTRAVENOUS at 10:12

## 2022-12-26 RX ADMIN — MUPIROCIN: 20 OINTMENT TOPICAL at 08:12

## 2022-12-26 RX ADMIN — ATORVASTATIN CALCIUM 40 MG: 20 TABLET, FILM COATED ORAL at 08:12

## 2022-12-26 NOTE — PLAN OF CARE
Goals remain appropriate.  Problem: Occupational Therapy  Goal: Occupational Therapy Goal  Description: Goals set 12/25 to be addressed for 14 days with expiration date, 1/8:  Patient will increase functional independence with ADLs by performing:    Patient will demonstrate supine -sit with modified independence.   Not met  Patient will demonstrate stand pivot transfers with modified independence.   Not met  Patient will demonstrate grooming while standing with modified independence.   Not met  Patient will demonstrate upper body dressing with modified independence while seated EOB.   Not met  Patient will demonstrate lower body dressing with modified independence while seated EOB.   Not met  Patient will demonstrate toileting with modified independence.   Not met  Patient will demonstrate bathing while seated EOB with modified independence.   Not met  Patient's family / caregiver will demonstrate independence and safety with assisting patient with self-care skills and functional mobility.     Not met  Patient and/or patient's family will verbalize understanding of stroke prevention guidelines, personal risk factors and stroke warning signs via teachback method.  Not met             Outcome: Ongoing, Progressing

## 2022-12-26 NOTE — PT/OT/SLP EVAL
Physical Therapy Evaluation and Treatment    Patient Name:  Madeline Carlton Ma   MRN:  [id number]  Admit Date: 12/25/2022  Admitting Diagnosis:  Class 2 obesity due to excess calories with body mass index (BMI) of 37.0 to 37.9 in adult   Length of Stay: 1 days  Recent Surgery: * No surgery found *      Recommendations:     Discharge Recommendations:  rehabilitation facility   Discharge Equipment Recommendations:  (TBD at next level)   Barriers to discharge: Evolving Clinical Presentation    Assessment:     Madeline Carlton Ma is a 37 y.o. female admitted with a medical diagnosis of Class 2 obesity due to excess calories with body mass index (BMI) of 37.0 to 37.9 in adult.  She presents with the following impairments/functional limitations: impaired functional mobility, gait instability, impaired balance, impaired endurance, impaired self care skills, decreased lower extremity function, impaired cognition, decreased safety awareness, impaired coordination.     Pt agreeable to therapy, very pleasant, difficult to understand due to dysarthria and possible aphasia. Pt highly impulsive, moves very quickly and recklessly, needs to be told to slow down many times to ensure safety. Pt SBA for bed mobility, CGA for STS, CGA to min A for gait with RW (attempted with HHA but pt too ataxic.). Ambulation with RW pt noted to be ataxic, difficulty with turns including one LOB requiring mod A to prevent fall, very poor balance both static and dynamic. Continue to train functional mobility and gait, ensure patient slows down to maintain safety.     Rehab Prognosis: Good; patient would benefit from acute skilled PT services to address these deficits and reach maximum level of function.      Treatment Tolerated: Well    Highest level of mobility achieved this visit: ambulates 75ft w/ RW and CGA to min A (Mod A for 1 LOB)    Activity with RN/PCT: transfers with 1 person assist    Plan:     During this hospitalization, patient to be seen  "4 x/week to address the identified rehab impairments via therapeutic activities, gait training, therapeutic exercises, neuromuscular re-education and progress towards the established goals.    Plan of Care Expires:  01/25/23    Subjective     RN Marisela notified prior to session. No family present upon PT entrance into room.    Chief Complaint: difficulty with balance  Patient/Family Comments/goals: "I'm used to being able to go go go all the time"  Pain/Comfort:  Pain Rating 1: 0/10    Social History:  Residence: lives with their spouse and children in 2-story house with 1 DALILA and flight to 2nd story where bedroom is located, LALA HR.  Support available: Spouse  Equipment Used: none  Equipment Owned (not using): None  Prior level of function: independent  Work: Employed.   Drive: yes.       Objective:     Additional staff present: none    Patient found supine with: telemetry, peripheral IV     General Precautions: Standard, Cardiac fall, aspiration   Orthopedic Precautions:N/A   Braces: N/A   Body mass index is 38.36 kg/m².  Oxygen Device: Room Air      Vitals: /72 (BP Location: Left arm, Patient Position: Lying)   Pulse 61   Temp 98.3 °F (36.8 °C) (Oral)   Resp 19   Ht 5' 1" (1.549 m)   Wt 92.1 kg (203 lb 0.7 oz)   LMP 12/17/2022 (Approximate)   SpO2 98%   Breastfeeding No   BMI 38.36 kg/m²     Exams:  Cognition:   Alert, Impulsive, and Cooperative  Command following: Follows one-step verbal commands  Fluency: expressive and receptive language deficits and dysarthria  Hearing: Intact  Vision:  diploplia  Skin Integrity: Visible skin intact    Physical Exam:   Edema - None noted  ROM - LALA LEs WFL  Strength - LALA LEs 5/5   Sensation - Intact to light touch  Coordination - Impaired ataxic gait    Outcome Measures:    AM-PAC 6 CLICK MOBILITY  Turning over in bed (including adjusting bedclothes, sheets and blankets)?: 4  Sitting down on and standing up from a chair with arms (e.g., wheelchair, bedside " commode, etc.): 3  Moving from lying on back to sitting on the side of the bed?: 3  Moving to and from a bed to a chair (including a wheelchair)?: 3  Need to walk in hospital room?: 2  Climbing 3-5 steps with a railing?: 1  Basic Mobility Total Score: 16     Functional Mobility:    Bed Mobility:   Scooting to HOB via supine bridge: independence  Supine to Sit: stand by assistance; from L side of bed  Scooting anteriorly to EOB to have both feet planted on floor: stand by assistance  Sit to Supine: stand by assistance; to R side of bed    Sitting Balance at Edge of Bed:  Assistance Level Required: Stand-by Assistance  Time: 5 min  Postural deviations noted: no deviations noted    Transfers:   Sit <> Stand Transfer: contact guard assistance with no assistive device  Stand <> Sit Transfer: contact guard assistance with rolling walker  [id number] from EOB    Standing Balance:  Assistance Level Required: Contact Guard Assistance  Patient used: no assistive device and rolling walker  Time: 1 min + 10 min  Postural deviations noted: no deviations noted      Gait:   Patient ambulated: 75ft   Patient required: contact guard and minimal assist  Patient used:  no assistive device and rolling walker  Gait Pattern observed: swing-through gait  Gait Deviation(s): unsteady gait, scissored gait, and ataxic gait  Impairments due to: impaired balance, decreased endurance, and impaired coordination  Gait belt utilized  Comments:   continual encouragement to slow down for safety  One moderate LOB with mod A to prevent fall    Education:  Time provided for education, counseling and discussion of health disposition in regards to patient's current status  All questions answered within PT scope of practice and to patient's satisfaction  PT role in POC to address current functional deficits  Pt educated on proper body mechanics, safety techniques, and energy conservation with PT facilitation and cueing throughout session  Call nursing/pct to  transfer to chair/use bathroom. Pt stated understanding.    Patient left HOB elevated with all lines intact and call button in reach.    GOALS:   Multidisciplinary Problems       Physical Therapy Goals          Problem: Physical Therapy    Goal Priority Disciplines Outcome Goal Variances Interventions   Physical Therapy Goal     PT, PT/OT Ongoing, Progressing     Description: Goals to met by 2023    1. Sit to stand transfer with Miller  2. Bed to chair transfer with Miller using LRAD  3. Gait  x 150 feet with Supervision using LRAD   4. Ascend/descend 15 stair(s) with bilateral Handrails Stand-by Assistance using LRAD.   5. Stand for 10 minutes with Stand-by Assistance using LRAD  6. Lower extremity exercise program x15 reps per Instruction, with assistance as needed in order to facilitate improved postural control and improvement in functional independence                         History:     Past Medical History:   Diagnosis Date    Migraine headache        Past Surgical History:   Procedure Laterality Date     SECTION  2006    MANDIBLE FRACTURE SURGERY         Time Tracking:     PT Received On: 22  PT Start Time: 1239     PT Stop Time: 1300  PT Total Time (min): 21 min     Billable Minutes: Evaluation 1 procedure and Gait Training 10 min    Adelfo Strong, PT, DPT  2022

## 2022-12-26 NOTE — ASSESSMENT & PLAN NOTE
38 y/o female with 3 week history of headache and cracked her neck tonight and headache got worse plus she started with dysarthri, had Ptosis nina. Seen in [facility] ED and no thrombolytics due to concern for discestion so transferred to [facility] ED fopr further evaluation and CTA head and neck reveals Abnormal segment of focal caliber change with luminal narrowing and irregularity involving the V2 segment of the left vertebral artery.  Additionally, there is focal luminal narrowing and irregularity involving the V3 segment of the right vertebral artery. MRI Brain reveals Several apparent small subtle foci of restricted diffusion within the bilateral cerebellar hemispheres as well as additional punctate focus within the paramedian posterior right occipital lobe suspicious for punctate acute infarcts.  There is subtle asymmetric diffusion hyperintensity within the left lara lynn and additional region of acute ischemia not excluded.     Antithrombotics: ASA 81 mg daily    Statins: Lipitor 40 mg daily    Aggressive risk factor modification: Diet, Exercise, Obesity, cracking her neck almost daily     Rehab efforts: The patient has been evaluated by a stroke team provider and the therapy needs have been fully considered based off the presenting complaints and exam findings. The following therapy evaluations are needed: PT evaluate and treat, OT evaluate and treat, SLP evaluate and treat    Diagnostics ordered/pending: Other: MBBS    VTE prophylaxis: Enoxaparin 40 mg SQ every 24 hours  Mechanical prophylaxis: Place SCDs    BP parameters: Infarct: No intervention, SBP <180

## 2022-12-26 NOTE — PLAN OF CARE
Problem: Adjustment to Illness (Stroke, Ischemic/Transient Ischemic Attack)  Goal: Optimal Coping  Outcome: Ongoing, Progressing     Problem: Bowel Elimination Impaired (Stroke, Ischemic/Transient Ischemic Attack)  Goal: Effective Bowel Elimination  Outcome: Ongoing, Progressing     Problem: Cerebral Tissue Perfusion (Stroke, Ischemic/Transient Ischemic Attack)  Goal: Optimal Cerebral Tissue Perfusion  Outcome: Ongoing, Progressing     Problem: Cognitive Impairment (Stroke, Ischemic/Transient Ischemic Attack)  Goal: Optimal Cognitive Function  Outcome: Ongoing, Progressing     Problem: Communication Impairment (Stroke, Ischemic/Transient Ischemic Attack)  Goal: Improved Communication Skills  Outcome: Ongoing, Progressing     Problem: Functional Ability Impaired (Stroke, Ischemic/Transient Ischemic Attack)  Goal: Optimal Functional Ability  Outcome: Ongoing, Progressing     Problem: Sensorimotor Impairment (Stroke, Ischemic/Transient Ischemic Attack)  Goal: Improved Sensorimotor Function  Outcome: Ongoing, Progressing     Problem: Swallowing Impairment (Stroke, Ischemic/Transient Ischemic Attack)  Goal: Optimal Eating and Swallowing without Aspiration  Outcome: Ongoing, Progressing     Problem: Urinary Elimination Impaired (Stroke, Ischemic/Transient Ischemic Attack)  Goal: Effective Urinary Elimination  Outcome: Ongoing, Progressing     Problem: Fall Injury Risk  Goal: Absence of Fall and Fall-Related Injury  Outcome: Ongoing, Progressing     Problem: Infection  Goal: Absence of Infection Signs and Symptoms  Outcome: Ongoing, Progressing     Problem: Adult Inpatient Plan of Care  Goal: Plan of Care Review  Outcome: Ongoing, Progressing     Problem: Skin Injury Risk Increased  Goal: Skin Health and Integrity  Outcome: Ongoing, Progressing

## 2022-12-26 NOTE — SUBJECTIVE & OBJECTIVE
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     Review of Systems   Constitutional:  Negative for chills and fever.   HENT:  Negative for drooling and ear discharge.    Eyes:  Positive for visual disturbance. Negative for pain.   Respiratory:  Negative for cough and shortness of breath.    Cardiovascular:  Negative for chest pain and leg swelling.   Gastrointestinal:  Negative for abdominal distention and abdominal pain.   Endocrine: Negative for polydipsia and polyphagia.   Genitourinary:  Negative for dysuria and enuresis.   Musculoskeletal:  Negative for arthralgias and back pain.   Skin:  Negative for rash and wound.   Allergic/Immunologic: Negative for food allergies and immunocompromised state.   Neurological:  Positive for speech difficulty, numbness and headaches.   Hematological:  Does not bruise/bleed easily.   Psychiatric/Behavioral:  Negative for agitation and confusion.    Scheduled Meds:   aspirin  81 mg Oral Daily    atorvastatin  40 mg Oral Daily    enoxaparin  40 mg Subcutaneous Daily    magnesium sulfate IVPB  2 g Intravenous Once    mupirocin   Nasal BID     Continuous Infusions:   sodium chloride 0.9% 75 mL/hr at 12/26/22 1009    sodium chloride 0.9%       PRN Meds:acetaminophen, ALPRAZolam, labetaloL, methocarbamoL, ondansetron, sodium chloride 0.9%, sodium chloride 0.9%    Objective:     Vital Signs (Most Recent):  Temp: 98.3 °F (36.8 °C) (12/26/22 1204)  Pulse: 82 (12/26/22 1204)  Resp: 19 (12/26/22 1204)  BP: 133/72 (12/26/22 1204)  SpO2: 98 % (12/26/22 1204)  BP Location: Left arm    Vital Signs Range (Last 24H):  Temp:  [97.2 °F (36.2 °C)-99.6 °F (37.6 °C)]   Pulse:  [60-87]   Resp:  [16-19]   BP: (114-140)/(59-87)   SpO2:  [96 %-99 %]   BP Location: Left arm    Physical Exam  Vitals and nursing note reviewed.   Constitutional:       Appearance:  Normal appearance. She is obese.   HENT:      Head: Normocephalic and atraumatic.   Eyes:      Extraocular Movements:      Left eye: Abnormal extraocular motion present.      Conjunctiva/sclera: Conjunctivae normal.      Pupils: Pupils are equal, round, and reactive to light.      Comments: Improving mobility and conjugate gaze   Cardiovascular:      Rate and Rhythm: Normal rate and regular rhythm.   Pulmonary:      Effort: Pulmonary effort is normal.      Breath sounds: Normal breath sounds.   Abdominal:      General: Abdomen is flat. Bowel sounds are normal.      Palpations: Abdomen is soft.   Musculoskeletal:         General: Normal range of motion.      Cervical back: Normal range of motion.   Skin:     General: Skin is warm and dry.   Neurological:      Mental Status: She is alert.      Sensory: Sensory deficit present.      Comments: Dysarthria, ptosis     Psychiatric:         Mood and Affect: Mood normal.         Behavior: Behavior normal.       Neurological Exam:   LOC: alert  Attention Span: Good   Language: No aphasia  Articulation: Dysarthria  Orientation: Person, Place, Time   Visual Fields: Hemianopsia left  EOM (CN III, IV, VI): Ptosis bilaterally, disconjugate gaze, L. Eye ophthalmoplegia   Pupils (CN II, III): PERRL  Facial Sensation (CN V): Normal  Facial Movement (CN VII): Symmetric facial expression    Gag Reflex: present  Reflexes: flexor plantar responses bilaterally  Motor: Arm left  Normal 5/5  Leg left  Normal 5/5  Arm right  Normal 5/5  Leg right Normal 5/5  Cerebellum: Upper Extremity Appendicular Ataxia (Finger Nose Finger)  Left only when both eyes open  Sensation: Govind-hypoesthesia left  Tone: Normal tone throughout       Laboratory:  CMP:   Recent Labs   Lab 12/26/22 0418   CALCIUM 8.2*   ALBUMIN 3.5   PROT 6.8      K 4.0   CO2 21*   *   BUN 11   CREATININE 0.8   ALKPHOS 76   ALT 23   AST 27   BILITOT 0.5     BMP:   Recent Labs   Lab 12/26/22  0418      K 4.0   CL  111*   CO2 21*   BUN 11   CREATININE 0.8   CALCIUM 8.2*     CBC:   Recent Labs   Lab 12/26/22  0418   WBC 6.32   RBC 4.57   HGB 13.2   HCT 42.2      MCV 92   MCH 28.9   MCHC 31.3*     Lipid Panel:   Recent Labs   Lab 12/25/22  0346   CHOL 179   LDLCALC 109.4   HDL 56   TRIG 68     Coagulation:   Recent Labs   Lab 12/25/22 0346   INR 1.0   APTT 25.0     Hgb A1C:   Recent Labs   Lab 12/25/22 0346   HGBA1C 4.8     TSH:   Recent Labs   Lab 12/25/22  0346   TSH 1.318       Diagnostic Results     Brain imaging:  CT Head w/o contrast 12-24-22 results:  No acute intracranial process identified     MRI Brain w/o contrast 12-25-22 results:    1.  Several apparent small subtle foci of restricted diffusion within the bilateral cerebellar hemispheres as well as additional punctate focus within the paramedian posterior right occipital lobe suspicious for punctate acute infarcts.  There is subtle asymmetric diffusion hyperintensity within the left lara lynn and additional region of acute ischemia not excluded.  Further evaluation and follow-up as warranted.     2.  Apparent small region of encephalomalacia within the left cerebellar hemisphere which may represent a remote infarct.  Additional possible punctate lacunar type infarct within the right superior cerebellar hemisphere.     3.  Abnormal T2 flow void involving the V3 segment of the right vertebral artery in this patient with suspected vertebral artery dissection.     4.  Several scattered foci of periventricular T2/FLAIR hyperintensity.  These lesions are nonspecific in appearance and may be seen with accelerated small vessel ischemic disease, vasculopathies, migraine headaches, and as the residua from inflammatory and traumatic insults to the brain.     Preliminary findings were discussed with Ede GAINES by myself at 03:31 on 12/25/2022.     Vessel Imaging:  CTA Head and neck 12-25-22 results:  1.  Abnormal segment of focal caliber change with luminal narrowing  and irregularity involving the V2 segment of the left vertebral artery.  Additionally, there is focal luminal narrowing and irregularity involving the V3 segment of the right vertebral artery.  Findings are concerning for possible bilateral vertebral artery dissection.  Vascular neurology consultation advised.  Further assessment could be performed with conventional angiography as warranted.     2.  Persistent asymmetric wedge-shaped region of parenchymal hypoattenuation within the left cerebellar hemisphere.  There is apparent contrast enhancement on the delayed series.  This is nonspecific although may relate to subacute infarct.  More sensitive assessment for ischemic could be performed with MRI if there are no patient contraindications.     3.  No evidence of cervical spine fracture.  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     Cardiac Evaluation:   EKG 12-24-22 results:  Normal sinus rhythm  Normal ECG  No previous ECGs available    TTE 12/25/22  The left ventricle is normal in size with normal systolic function.  The estimated ejection fraction is 65%.  Normal left ventricular diastolic function.  Normal right ventricular size with normal right ventricular systolic function.  Normal central venous pressure (3 mmHg).  The estimated PA systolic pressure is 27 mmHg.

## 2022-12-26 NOTE — PLAN OF CARE
Problem: Adult Inpatient Plan of Care  Goal: Plan of Care Review  Outcome: Ongoing, Progressing  Flowsheets (Taken 12/26/2022 1618)  Plan of Care Reviewed With:   patient   spouse  Goal: Patient-Specific Goal (Individualized)  Outcome: Ongoing, Progressing  Flowsheets (Taken 12/26/2022 1618)  Anxieties, Fears or Concerns: none  Individualized Care Needs: none  Patient-Specific Goals (Include Timeframe): none       POC reviewed with the patient and they verbalized understanding. All comments and concerns addressed. Bed locked in lowest position with bed alarm set, call light within reach. Safety precautions maintained. VSS, see flowsheets. No events this shift.

## 2022-12-26 NOTE — PROGRESS NOTES
Sonny Dover - Neurosurgery ([facility])  Vascular Neurology  Comprehensive Stroke Center  Progress Note    Assessment/Plan:     * Class 2 obesity due to excess calories with body mass index (BMI) of 37.0 to 37.9 in adult  Stroke risk factor   on diet and exercise    New infarction of cerebellum  38 y/o female with 3 week history of headache and cracked her neck tonight and headache got worse plus she started with dysarthri, had Ptosis nina. Seen in [facility] ED and no thrombolytics due to concern for discestion so transferred to [facility] ED fopr further evaluation and CTA head and neck reveals Abnormal segment of focal caliber change with luminal narrowing and irregularity involving the V2 segment of the left vertebral artery.  Additionally, there is focal luminal narrowing and irregularity involving the V3 segment of the right vertebral artery. MRI Brain reveals Several apparent small subtle foci of restricted diffusion within the bilateral cerebellar hemispheres as well as additional punctate focus within the paramedian posterior right occipital lobe suspicious for punctate acute infarcts.  There is subtle asymmetric diffusion hyperintensity within the left lara lynn and additional region of acute ischemia not excluded.     Antithrombotics: ASA 81 mg daily    Statins: Lipitor 40 mg daily    Aggressive risk factor modification: Diet, Exercise, Obesity, cracking her neck almost daily     Rehab efforts: The patient has been evaluated by a stroke team provider and the therapy needs have been fully considered based off the presenting complaints and exam findings. The following therapy evaluations are needed: PT evaluate and treat, OT evaluate and treat, SLP evaluate and treat    Diagnostics ordered/pending: Other: MBBS    VTE prophylaxis: Enoxaparin 40 mg SQ every 24 hours  Mechanical prophylaxis: Place SCDs    BP parameters: Infarct: No intervention, SBP <180      Vertebral artery dissection  See below  stroke    Ptosis, bilateral  Due to stroke  Resolving partially         No notes on file    STROKE DOCUMENTATION   Acute Stroke Times   Last Known Normal Date: 12/24/22  Last Known Normal Time: 2200  Symptom Onset Date: 12/24/20  Symptom Onset Time: 2200  Stroke Team Called Date: 12/25/22  Stroke Team Called Time: 0124  Stroke Team Arrival Date: 12/25/20  Stroke Team Arrival Time: 0130  CT Interpretation Time: 0000  Thrombolytic Therapy Recommended: No  CTA Interpretation Time: 0152  Thrombectomy Recommended: No      NIH Scale:  1a. Level of Consciousness: 0-->Alert, keenly responsive  1b. LOC Questions: 0-->Answers both questions correctly  1c. LOC Commands: 0-->Performs both tasks correctly  2. Best Gaze: 1-->Partial gaze palsy, gaze is abnormal in one or both eyes, but forced deviation or total gaze paresis is not present  3. Visual: 1-->Partial hemianopia  4. Facial Palsy: 0-->Normal symmetrical movements  5a. Motor Arm, Left: 0-->No drift, limb holds 90 (or 45) degrees for full 10 secs  5b. Motor Arm, Right: 0-->No drift, limb holds 90 (or 45) degrees for full 10 secs  6a. Motor Leg, Left: 0-->No drift, leg holds 30 degree position for full 5 secs  6b. Motor Leg, Right: 0-->No drift, leg holds 30 degree position for full 5 secs  7. Limb Ataxia: 0-->Absent  8. Sensory: 1-->Mild-to-moderate sensory loss, patient feels pinprick is less sharp or is dull on the affected side, or there is a loss of superficial pain with pinprick, but patient is aware of being touched  9. Best Language: 0-->No aphasia, normal  10. Dysarthria: 1-->Mild-to-moderate dysarthria, patient slurs at least some words and, at worst, can be understood with some difficulty  11. Extinction and Inattention (formerly Neglect): 0-->No abnormality  Total (NIH Stroke Scale): 4     Modified Magee Score: 0  [place] Coma Scale:    ABCD2 Score:    [id number] Score:   HAS -BLED Score:   ICH Score:   Hunt & Cali Classification:      Hemorrhagic change of  an Ischemic Stroke: Does this patient have an ischemic stroke with hemorrhagic changes? No     Neurologic Chief Complaint: Bilateral Vertebral Artery dissections    Subjective:     HPI, Past Medical, Family, and Social History remains the same as documented in the initial encounter.     Review of Systems   Constitutional:  Negative for chills and fever.   HENT:  Negative for drooling and ear discharge.    Eyes:  Positive for visual disturbance. Negative for pain.   Respiratory:  Negative for cough and shortness of breath.    Cardiovascular:  Negative for chest pain and leg swelling.   Gastrointestinal:  Negative for abdominal distention and abdominal pain.   Endocrine: Negative for polydipsia and polyphagia.   Genitourinary:  Negative for dysuria and enuresis.   Musculoskeletal:  Negative for arthralgias and back pain.   Skin:  Negative for rash and wound.   Allergic/Immunologic: Negative for food allergies and immunocompromised state.   Neurological:  Positive for speech difficulty, numbness and headaches.   Hematological:  Does not bruise/bleed easily.   Psychiatric/Behavioral:  Negative for agitation and confusion.    Scheduled Meds:   aspirin  81 mg Oral Daily    atorvastatin  40 mg Oral Daily    enoxaparin  40 mg Subcutaneous Daily    magnesium sulfate IVPB  2 g Intravenous Once    mupirocin   Nasal BID     Continuous Infusions:   sodium chloride 0.9% 75 mL/hr at 12/26/22 1009    sodium chloride 0.9%       PRN Meds:acetaminophen, ALPRAZolam, labetaloL, methocarbamoL, ondansetron, sodium chloride 0.9%, sodium chloride 0.9%    Objective:     Vital Signs (Most Recent):  Temp: 98.3 °F (36.8 °C) (12/26/22 1204)  Pulse: 82 (12/26/22 1204)  Resp: 19 (12/26/22 1204)  BP: 133/72 (12/26/22 1204)  SpO2: 98 % (12/26/22 1204)  BP Location: Left arm    Vital Signs Range (Last 24H):  Temp:  [97.2 °F (36.2 °C)-99.6 °F (37.6 °C)]   Pulse:  [60-87]   Resp:  [16-19]   BP: (114-140)/(59-87)   SpO2:  [96 %-99 %]   BP Location: Left  arm    Physical Exam  Vitals and nursing note reviewed.   Constitutional:       Appearance: Normal appearance. She is obese.   HENT:      Head: Normocephalic and atraumatic.   Eyes:      Extraocular Movements:      Left eye: Abnormal extraocular motion present.      Conjunctiva/sclera: Conjunctivae normal.      Pupils: Pupils are equal, round, and reactive to light.      Comments: Improving mobility and conjugate gaze   Cardiovascular:      Rate and Rhythm: Normal rate and regular rhythm.   Pulmonary:      Effort: Pulmonary effort is normal.      Breath sounds: Normal breath sounds.   Abdominal:      General: Abdomen is flat. Bowel sounds are normal.      Palpations: Abdomen is soft.   Musculoskeletal:         General: Normal range of motion.      Cervical back: Normal range of motion.   Skin:     General: Skin is warm and dry.   Neurological:      Mental Status: She is alert.      Sensory: Sensory deficit present.      Comments: Dysarthria, ptosis     Psychiatric:         Mood and Affect: Mood normal.         Behavior: Behavior normal.       Neurological Exam:   LOC: alert  Attention Span: Good   Language: No aphasia  Articulation: Dysarthria  Orientation: Person, Place, Time   Visual Fields: Hemianopsia left  EOM (CN III, IV, VI): Ptosis bilaterally, disconjugate gaze, L. Eye ophthalmoplegia   Pupils (CN II, III): PERRL  Facial Sensation (CN V): Normal  Facial Movement (CN VII): Symmetric facial expression    Gag Reflex: present  Reflexes: flexor plantar responses bilaterally  Motor: Arm left  Normal 5/5  Leg left  Normal 5/5  Arm right  Normal 5/5  Leg right Normal 5/5  Cerebellum: Upper Extremity Appendicular Ataxia (Finger Nose Finger)  Left only when both eyes open  Sensation: Govind-hypoesthesia left  Tone: Normal tone throughout       Laboratory:  CMP:   Recent Labs   Lab 12/26/22  0418   CALCIUM 8.2*   ALBUMIN 3.5   PROT 6.8      K 4.0   CO2 21*   *   BUN 11   CREATININE 0.8   ALKPHOS 76   ALT 23    AST 27   BILITOT 0.5     BMP:   Recent Labs   Lab 12/26/22 0418      K 4.0   *   CO2 21*   BUN 11   CREATININE 0.8   CALCIUM 8.2*     CBC:   Recent Labs   Lab 12/26/22 0418   WBC 6.32   RBC 4.57   HGB 13.2   HCT 42.2      MCV 92   MCH 28.9   MCHC 31.3*     Lipid Panel:   Recent Labs   Lab 12/25/22 0346   CHOL 179   LDLCALC 109.4   HDL 56   TRIG 68     Coagulation:   Recent Labs   Lab 12/25/22 0346   INR 1.0   APTT 25.0     Hgb A1C:   Recent Labs   Lab 12/25/22 0346   HGBA1C 4.8     TSH:   Recent Labs   Lab 12/25/22 0346   TSH 1.318       Diagnostic Results     Brain imaging:  CT Head w/o contrast 12-24-22 results:  No acute intracranial process identified     MRI Brain w/o contrast 12-25-22 results:    1.  Several apparent small subtle foci of restricted diffusion within the bilateral cerebellar hemispheres as well as additional punctate focus within the paramedian posterior right occipital lobe suspicious for punctate acute infarcts.  There is subtle asymmetric diffusion hyperintensity within the left lara lynn and additional region of acute ischemia not excluded.  Further evaluation and follow-up as warranted.     2.  Apparent small region of encephalomalacia within the left cerebellar hemisphere which may represent a remote infarct.  Additional possible punctate lacunar type infarct within the right superior cerebellar hemisphere.     3.  Abnormal T2 flow void involving the V3 segment of the right vertebral artery in this patient with suspected vertebral artery dissection.     4.  Several scattered foci of periventricular T2/FLAIR hyperintensity.  These lesions are nonspecific in appearance and may be seen with accelerated small vessel ischemic disease, vasculopathies, migraine headaches, and as the residua from inflammatory and traumatic insults to the brain.     Preliminary findings were discussed with Ede GAINES by myself at 03:31 on 12/25/2022.     Vessel Imaging:  CTA Head and neck  12-25-22 results:  1.  Abnormal segment of focal caliber change with luminal narrowing and irregularity involving the V2 segment of the left vertebral artery.  Additionally, there is focal luminal narrowing and irregularity involving the V3 segment of the right vertebral artery.  Findings are concerning for possible bilateral vertebral artery dissection.  Vascular neurology consultation advised.  Further assessment could be performed with conventional angiography as warranted.     2.  Persistent asymmetric wedge-shaped region of parenchymal hypoattenuation within the left cerebellar hemisphere.  There is apparent contrast enhancement on the delayed series.  This is nonspecific although may relate to subacute infarct.  More sensitive assessment for ischemic could be performed with MRI if there are no patient contraindications.     3.  No evidence of cervical spine fracture.  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     Cardiac Evaluation:   EKG 12-24-22 results:  Normal sinus rhythm  Normal ECG  No previous ECGs available    TTE 12/25/22  The left ventricle is normal in size with normal systolic function.  The estimated ejection fraction is 65%.  Normal left ventricular diastolic function.  Normal right ventricular size with normal right ventricular systolic function.  Normal central venous pressure (3 mmHg).  The estimated PA systolic pressure is 27 mmHg.            Sundar Norwood MD  Comprehensive Stroke Center  Department of Vascular Neurology   [facility])

## 2022-12-26 NOTE — PT/OT/SLP PROGRESS
"Occupational Therapy   Treatment    Name: Madeline Carlton Ma  MRN: [id number]  Admitting Diagnosis:  Class 2 obesity due to excess calories with body mass index (BMI) of 37.0 to 37.9 in adult       Recommendations:     Discharge Recommendations: rehabilitation facility  Discharge Equipment Recommendations:  shower chair, bedside commode  Barriers to discharge:  None    Assessment:     Madeline Carlton Ma is a 37 y.o. female with a medical diagnosis of Class 2 obesity due to excess calories with body mass index (BMI) of 37.0 to 37.9 in adult.  She presents with performance deficits affecting function are weakness, impaired self care skills, impaired functional mobility, impaired balance, impaired coordination, decreased coordination.     Rehab Prognosis:  Good; patient would benefit from acute skilled OT services to address these deficits and reach maximum level of function.       Plan:     Patient to be seen 4 x/week to address the above listed problems via self-care/home management, therapeutic activities, therapeutic exercises, neuromuscular re-education  Plan of Care Expires: 01/23/23  Plan of Care Reviewed with: patient, spouse    Subjective   Patient:  "I am disappointed I can't eat yet.  I have to do a swallow test today."  Pain/Comfort:  Pain Rating 1: 0/10  Pain Rating Post-Intervention 1: 0/10    Objective:     Communicated with: Nurse prior to session.  Patient found supine with blood pressure cuff, telemetry, pulse ox (continuous), peripheral IV upon OT entry to room.    General Precautions: Standard, aspiration, fall    Orthopedic Precautions:N/A  Braces: N/A  Respiratory Status: Room air     Occupational Performance:     Bed Mobility:    Patient completed Rolling/Turning to Left with  modified independence  Patient completed Rolling/Turning to Right with modified independence  Patient completed Supine to Sit with supervision  Patient completed Sit to Supine with supervision     Functional " Mobility/Transfers:  Patient completed Sit <> Stand Transfer with stand by assistance  with  no assistive device   Patient completed Bed <> Chair Transfer using Stand Pivot technique with CGA with no assistive device    Activities of Daily Living:  Grooming: contact guard assistance while standing  Upper Body Dressing: contact guard assistance while gathering clothing  Lower Body Dressing: minimum assistance for standing balance    [facility] 6 Click ADL: 18    Treatment & Education:  Patient alert and oriented x 3; able to follow 4/4 one step commands.  Patient attentive and interactive throughout the session. Addressed coordination tasks while seated and standing; min assist required.    Patient left supine with all lines intact, call button in reach, and bed alarm on    GOALS:   Multidisciplinary Problems       Occupational Therapy Goals          Problem: Occupational Therapy    Goal Priority Disciplines Outcome Interventions   Occupational Therapy Goal     OT, PT/OT Ongoing, Progressing    Description: Goals set 12/25 to be addressed for 14 days with expiration date, 1/8:  Patient will increase functional independence with ADLs by performing:    Patient will demonstrate supine -sit with modified independence.   Not met  Patient will demonstrate stand pivot transfers with modified independence.   Not met  Patient will demonstrate grooming while standing with modified independence.   Not met  Patient will demonstrate upper body dressing with modified independence while seated EOB.   Not met  Patient will demonstrate lower body dressing with modified independence while seated EOB.   Not met  Patient will demonstrate toileting with modified independence.   Not met  Patient will demonstrate bathing while seated EOB with modified independence.   Not met  Patient's family / caregiver will demonstrate independence and safety with assisting patient with self-care skills and functional mobility.     Not met  Patient and/or  patient's family will verbalize understanding of stroke prevention guidelines, personal risk factors and stroke warning signs via teachback method.  Not met                                  Time Tracking:     OT Date of Treatment: 12/26/22  OT Start Time: 0640  OT Stop Time: 0704  OT Total Time (min): 24 min    Billable Minutes:Self Care/Home Management 12  Neuromuscular Re-education 12    OT/DEBBIE: OT          12/26/2022

## 2022-12-26 NOTE — PLAN OF CARE
Recommendations     1. If alternative route of nutrition warranted, initiate TF regimen of Isosource 1.5 @ 30 mL/hr- provides 1080 kcals, 49 g pro, and 550 mL free water.   2. If able to tolerate PO intake, ADAT to regular- texture per SLP.   3. RD following.     Goals: Will meet % EEN/EPN by next RD f/u.  Nutrition Goal Status: new  Communication of RD Recs:  (POC)    Faiza Anthony, Registration Eligible, Provisional LDN

## 2022-12-26 NOTE — CONSULTS
"Sonny Dover - Neurosurgery ([facility])  Adult Nutrition  Consult Note    SUMMARY     Recommendations    1. If alternative route of nutrition warranted, initiate TF regimen of Isosource 1.5 @ 30 mL/hr- provides 1080 kcals, 49 g pro, and 550 mL free water.   2. If able to tolerate PO intake, ADAT to regular- texture per SLP.   3. RD following.    Goals: Will meet % EEN/EPN by next RD f/u.  Nutrition Goal Status: new  Communication of RD Recs:  (POC)    Assessment and Plan    Nutrition Problem  Inadequate oral intake     Related to (etiology):   Inability to consume sufficient needs     Signs and Symptoms (as evidenced by):   NPO status      Interventions/Recommendations (treatment strategy):  Collaboration of nutrition care with other providers      Nutrition Diagnosis Status:   New    Reason for Assessment    Reason For Assessment: consult  Diagnosis:  (Class 2 obesity d/t excess calories with BMI of 37.0-37.9 in adult)  Relevant Medical History: New infarction of cerebellum  Interdisciplinary Rounds: did not attend  General Information Comments: RD consulted for stroke pathway. Spoke with pt at bedside. States great appetite PTA with at least 3 meals/day on general diet. Currently NPO per SLP rec's on 12/25. No issues with n/v/d/c. #. NPFE not warranted 2/2 appearing well-nourished. LBM 12/24.  Nutrition Discharge Planning: Pending clinical course    Nutrition Risk Screen    Nutrition Risk Screen: difficulty chewing/swallowing    Nutrition/Diet History    Patient Reported Diet/Restrictions/Preferences: general  Spiritual, Cultural Beliefs, Jain Practices, Values that Affect Care: no  Food Allergies: NKFA  Factors Affecting Nutritional Intake: NPO, difficulty/impaired swallowing    Anthropometrics    Temp: 97.2 °F (36.2 °C)  Height: 5' 1" (154.9 cm)  Height (inches): 61 in  Weight Method: Bed Scale  Weight: 92.1 kg (203 lb 0.7 oz)  Weight (lb): 203.05 lb  Ideal Body Weight (IBW), Female: 105 lb  % " Ideal Body Weight, Female (lb): 193.38 %  BMI (Calculated): 38.4  BMI Grade: 35 - 39.9 - obesity - grade II    Lab/Procedures/Meds    Pertinent Labs Reviewed: reviewed  Pertinent Labs Comments: Ca 8.2, Phos 2.1  Pertinent Medications Reviewed: reviewed  Pertinent Medications Comments: aspirin, enoxaparin    Estimated/Assessed Needs    Weight Used For Calorie Calculations: 47.6 kg (105 lb)  Energy Calorie Requirements (kcal): 1190 kcals  Energy Need Method: Kcal/kg (25 kcal/kg IBW)  Protein Requirements: 48 g (1.0 g/kg IBW)  Weight Used For Protein Calculations: 47.6 kg (105 lb)  Fluid Requirements (mL): 1 ml or fluid per MD  Estimated Fluid Requirement Method: RDA Method  RDA Method (mL): 1190    Nutrition Prescription Ordered    Current Diet Order: NPO    Evaluation of Received Nutrient/Fluid Intake    I/O: -  % Intake of Estimated Energy Needs: 0%  % Meal Intake: NPO    Nutrition Risk    Level of Risk/Frequency of Follow-up:  (1 time/week)     Monitor and Evaluation    Food and Nutrient Intake: energy intake, food and beverage intake, enteral nutrition intake  Food and Nutrient Adminstration: diet order, enteral and parenteral nutrition administration  Knowledge/Beliefs/Attitudes: food and nutrition knowledge/skill, beliefs and attitudes  Physical Activity and Function: nutrition-related ADLs and IADLs  Anthropometric Measurements: weight, height/length, weight change, body mass index  Biochemical Data, Medical Tests and Procedures: glucose/endocrine profile, electrolyte and renal panel, gastrointestinal profile, inflammatory profile, lipid profile  Nutrition-Focused Physical Findings: overall appearance     Nutrition Follow-Up    RD Follow-up?: Yes    Faiza Anthony, Registration Eligible, Provisional LDN

## 2022-12-27 LAB
ALBUMIN SERPL BCP-MCNC: 3.3 G/DL (ref 3.5–5.2)
ALP SERPL-CCNC: 72 U/L (ref 55–135)
ALT SERPL W/O P-5'-P-CCNC: 18 U/L (ref 10–44)
ANION GAP SERPL CALC-SCNC: 8 MMOL/L (ref 8–16)
AST SERPL-CCNC: 23 U/L (ref 10–40)
BASOPHILS # BLD AUTO: 0.03 K/UL (ref 0–0.2)
BASOPHILS NFR BLD: 0.4 % (ref 0–1.9)
BILIRUB SERPL-MCNC: 0.5 MG/DL (ref 0.1–1)
BUN SERPL-MCNC: 11 MG/DL (ref 6–20)
CALCIUM SERPL-MCNC: 8.2 MG/DL (ref 8.7–10.5)
CHLORIDE SERPL-SCNC: 113 MMOL/L (ref 95–110)
CO2 SERPL-SCNC: 18 MMOL/L (ref 23–29)
CREAT SERPL-MCNC: 0.7 MG/DL (ref 0.5–1.4)
DIFFERENTIAL METHOD: ABNORMAL
EOSINOPHIL # BLD AUTO: 0.1 K/UL (ref 0–0.5)
EOSINOPHIL NFR BLD: 1.9 % (ref 0–8)
ERYTHROCYTE [DISTWIDTH] IN BLOOD BY AUTOMATED COUNT: 13.9 % (ref 11.5–14.5)
EST. GFR  (NO RACE VARIABLE): >60 ML/MIN/1.73 M^2
GLUCOSE SERPL-MCNC: 73 MG/DL (ref 70–110)
HCT VFR BLD AUTO: 41.5 % (ref 37–48.5)
HGB BLD-MCNC: 13.1 G/DL (ref 12–16)
IMM GRANULOCYTES # BLD AUTO: 0.02 K/UL (ref 0–0.04)
IMM GRANULOCYTES NFR BLD AUTO: 0.3 % (ref 0–0.5)
LYMPHOCYTES # BLD AUTO: 1.7 K/UL (ref 1–4.8)
LYMPHOCYTES NFR BLD: 24.6 % (ref 18–48)
MCH RBC QN AUTO: 29 PG (ref 27–31)
MCHC RBC AUTO-ENTMCNC: 31.6 G/DL (ref 32–36)
MCV RBC AUTO: 92 FL (ref 82–98)
MONOCYTES # BLD AUTO: 0.5 K/UL (ref 0.3–1)
MONOCYTES NFR BLD: 7.6 % (ref 4–15)
NEUTROPHILS # BLD AUTO: 4.5 K/UL (ref 1.8–7.7)
NEUTROPHILS NFR BLD: 65.2 % (ref 38–73)
NRBC BLD-RTO: 0 /100 WBC
PLATELET # BLD AUTO: 199 K/UL (ref 150–450)
PMV BLD AUTO: 10.9 FL (ref 9.2–12.9)
POTASSIUM SERPL-SCNC: 4.2 MMOL/L (ref 3.5–5.1)
PROT SERPL-MCNC: 6.4 G/DL (ref 6–8.4)
RBC # BLD AUTO: 4.52 M/UL (ref 4–5.4)
SODIUM SERPL-SCNC: 139 MMOL/L (ref 136–145)
WBC # BLD AUTO: 6.94 K/UL (ref 3.9–12.7)

## 2022-12-27 PROCEDURE — 11000001 HC ACUTE MED/SURG PRIVATE ROOM

## 2022-12-27 PROCEDURE — 36415 COLL VENOUS BLD VENIPUNCTURE: CPT | Performed by: NURSE PRACTITIONER

## 2022-12-27 PROCEDURE — 85025 COMPLETE CBC W/AUTO DIFF WBC: CPT | Performed by: NURSE PRACTITIONER

## 2022-12-27 PROCEDURE — 25000003 PHARM REV CODE 250: Performed by: NURSE PRACTITIONER

## 2022-12-27 PROCEDURE — 97535 SELF CARE MNGMENT TRAINING: CPT

## 2022-12-27 PROCEDURE — 97116 GAIT TRAINING THERAPY: CPT

## 2022-12-27 PROCEDURE — 63600175 PHARM REV CODE 636 W HCPCS: Performed by: NURSE PRACTITIONER

## 2022-12-27 PROCEDURE — 92526 ORAL FUNCTION THERAPY: CPT

## 2022-12-27 PROCEDURE — 80053 COMPREHEN METABOLIC PANEL: CPT | Performed by: NURSE PRACTITIONER

## 2022-12-27 PROCEDURE — 99233 PR SUBSEQUENT HOSPITAL CARE,LEVL III: ICD-10-PCS | Mod: ,,, | Performed by: PSYCHIATRY & NEUROLOGY

## 2022-12-27 PROCEDURE — 25500020 PHARM REV CODE 255: Performed by: PSYCHIATRY & NEUROLOGY

## 2022-12-27 PROCEDURE — 92611 MOTION FLUOROSCOPY/SWALLOW: CPT

## 2022-12-27 PROCEDURE — 99233 SBSQ HOSP IP/OBS HIGH 50: CPT | Mod: ,,, | Performed by: PSYCHIATRY & NEUROLOGY

## 2022-12-27 PROCEDURE — A9698 NON-RAD CONTRAST MATERIALNOC: HCPCS | Performed by: PSYCHIATRY & NEUROLOGY

## 2022-12-27 RX ORDER — ASPIRIN 81 MG/1
81 TABLET ORAL DAILY
Qty: 360 TABLET | Refills: 0 | Status: SHIPPED | OUTPATIENT
Start: 2022-12-28 | End: 2022-12-30 | Stop reason: SDUPTHER

## 2022-12-27 RX ORDER — ATORVASTATIN CALCIUM 40 MG/1
40 TABLET, FILM COATED ORAL DAILY
Qty: 90 TABLET | Refills: 3 | Status: SHIPPED | OUTPATIENT
Start: 2022-12-28 | End: 2022-12-30 | Stop reason: SDUPTHER

## 2022-12-27 RX ADMIN — MUPIROCIN: 20 OINTMENT TOPICAL at 09:12

## 2022-12-27 RX ADMIN — BARIUM SULFATE 120 ML: 0.81 POWDER, FOR SUSPENSION ORAL at 10:12

## 2022-12-27 RX ADMIN — ENOXAPARIN SODIUM 40 MG: 40 INJECTION SUBCUTANEOUS at 05:12

## 2022-12-27 RX ADMIN — MUPIROCIN: 20 OINTMENT TOPICAL at 10:12

## 2022-12-27 RX ADMIN — ASPIRIN 81 MG: 81 TABLET, COATED ORAL at 10:12

## 2022-12-27 RX ADMIN — ATORVASTATIN CALCIUM 40 MG: 20 TABLET, FILM COATED ORAL at 10:12

## 2022-12-27 NOTE — PROCEDURES
Modified Barium Swallow    Patient Name:  Madeline Carlton Ma   MRN:  [id number]      Recommendations:     Recommendations:                General Recommendations:  Dysphagia therapy, Speech language evaluation, and Cognitive-linguistic evaluation  Diet recommendations:  Regular, Nectar Thick   Aspiration Precautions: 1 bite/sip at a time, HOB to 90 degrees, Meds whole 1 at a time, No straws, and Standard aspiration precautions   General Precautions: Standard, fall, aspiration  Communication strategies:  provide increased time to answer    Referral     Reason for Referral  Patient was referred for a Modified Barium Swallow Study to assess the efficiency of his/her swallow function, rule out aspiration and make recommendations regarding safe dietary consistencies, effective compensatory strategies, and safe eating environment.     Diagnosis: Class 2 obesity due to excess calories with body mass index (BMI) of 37.0 to 37.9 in adult       History:     Past Medical History:   Diagnosis Date    Migraine headache        Objective:     Current Respiratory Status: 12/27/22    Alert: yes    Cooperative: yes    Follows Directions: yes    Visualization  Patient was seen in the lateral view    Oral Peripheral Examination  Oral Musculature: general weakness, facial asymmetry present, other (see comments)  Structural Abnormalities: Pt with decreased lingual sensation, bilateral  Dentition: present and adequate  Secretion Management: adequate  Mucosal Quality: adequate  Mandibular Strength and Mobility: impaired  Oral Labial Strength and Mobility: impaired coordination  Volitional Cough: elicited, strong  Volitional Swallow: elicited, inconsistent timing of initiation  Voice Prior to PO Intake: clear vocal quality, adequate intensity, +dysarthria    Consistencies Assessed  Thin teaspoon and cup sips x1 cup  Nectar thick one cup  Puree teaspoon x2  Solids 1/2 cracker    Oral Preparation/Oral Phase  WFL- Pt with adequate bolus  acceptance, containment, control and timely A-P transfer across consistencies     Pharyngeal Phase   Pt with silent aspiration of thin liquids during cyclical drinking and aspiration with a cough on 2nd trial of thins with cyclical drinking. No aspiration with single cup sips. However, pt unable to clear aspirated material despite cues to cough. Pt with no penetration or aspiration noted following nectar thick liquids, pureed or solids. Pt with trace valleculae stasis that cleared with cues to dry swallow. Occasional dry swallows noted.     Cervical Esophageal Phase  wfl    Assessment:     Impressions    Patient demonstrates mild pharyngeal dysphagia characterized by aspiration of thin liquids during cyclical swallows.     Prognosis: Good    Barriers:  None    Plan  Continues with speech tx    Education  Results were discussed with patient and  at length. Reviewed results of mbs, s/s of aspiration, swallowing precautions and use of thickener. Discussed plan of care and likely advanced of diet quickly due to pt's improvement noted each day. Answered all questions within scope of practice. Also returned to room to update white board. Results were discussed with Medical Team who was in agreement with plan.     Goals:   Multidisciplinary Problems       SLP Goals          Problem: SLP    Goal Priority Disciplines Outcome   SLP Goal     SLP Ongoing, Progressing   Description: Speech Language Pathology Goals  Goals expected to be met by 1/1/23    1.Pt will participate in ongoing assessment of swallow function to determine safest, least restrictive means of nutrition/hydration  2. Educate Pt and family on aspiration precautions and SLP POC  3. Pt will participate in further assessment of speech, language and cognition to determine ongoing POC needs                        Plan:   Patient to be seen:  Therapy Frequency: 4 x/week   Plan of Care expires:  01/23/23  Plan of Care reviewed with:  patient, spouse         Discharge recommendations:  rehabilitation facility   Barriers to Discharge:  None    Time Tracking:   SLP Treatment Date:   12/27/22  Speech Start Time:  1000  Speech Stop Time:  1025     Speech Total Time (min):  25 min    12/27/2022

## 2022-12-27 NOTE — PT/OT/SLP PROGRESS
Physical Therapy      Patient Name:  Madeline Carlton Ma   MRN:  [id number]    Patient not seen today secondary to Off the floor for procedure/surgery. PT will continue to follow.

## 2022-12-27 NOTE — PT/OT/SLP PROGRESS
Physical Therapy Treatment    Patient Name:  Madeline Carlton Ma   MRN:  [id number]    Recommendations:     Discharge Recommendations: rehabilitation facility  Discharge Equipment Recommendations:  (TBD)  Barriers to discharge: None    Assessment:     Madeline Carlton Ma is a 37 y.o. female admitted with a medical diagnosis of Class 2 obesity due to excess calories with body mass index (BMI) of 37.0 to 37.9 in adult.  She presents with the following impairments/functional limitations: gait instability, impaired balance, decreased lower extremity function, impaired coordination, impaired sensation, impaired functional mobility, decreased coordination .    Patient eager to improve functional mobility.  Patient's upright significantly limited by ataxia-.  Provided patient with simulated weighted walker - (applied downward pressure on the walker -standing behind patient-  to make it heavier - providing an increase in proprioception for the patient. Patient able to demonstrate smoother, less ataxic, reciprocal gait pattern and able to turn walker without any loss of balance.  Varied the amount of pressure applied to walker during gait training and cued the attempt to maintain/control smooth reciprocal gait pattern.  Patient reports diplopia also impacts her depth perception when walking --may benefit from eye patch.  Patient will continue to benefit from skilled PT to address deficits and maximize function.     Rehab Prognosis: Good;   Recent Surgery: * No surgery found *      Plan:     During this hospitalization, patient to be seen 4 x/week to address the identified rehab impairments via gait training, therapeutic activities, therapeutic exercises, neuromuscular re-education and progress toward the following goals:    Plan of Care Expires:  01/25/23    Subjective     Chief Complaint: none stated  Patient/Family Comments/goals: none stated  Pain/Comfort:  Pain Rating 1: 0/10      Objective:     Communicated with RN prior  to session.  Patient found supine with telemetry, peripheral IV upon PT entry to room.     General Precautions: Standard, fall  Orthopedic Precautions: N/A  Braces: N/A  Respiratory Status: Room air     Functional Mobility:  Bed Mobility:     Scooting: contact guard assistance  Supine to Sit: contact guard assistance  Transfers:     Sit to Stand:  minimum assistance with rolling walker  Gait: Patient ambulated 125 feet with RW with min A - weighted walker       AM-PAC 6 CLICK MOBILITY  Turning over in bed (including adjusting bedclothes, sheets and blankets)?: 4  Sitting down on and standing up from a chair with arms (e.g., wheelchair, bedside commode, etc.): 3  Moving from lying on back to sitting on the side of the bed?: 3  Moving to and from a bed to a chair (including a wheelchair)?: 3  Need to walk in hospital room?: 2  Climbing 3-5 steps with a railing?: 1  Basic Mobility Total Score: 16     Patient left supine with all lines intact, call button in reach, and RN notified..    GOALS:   Multidisciplinary Problems       Physical Therapy Goals          Problem: Physical Therapy    Goal Priority Disciplines Outcome Goal Variances Interventions   Physical Therapy Goal     PT, PT/OT Ongoing, Progressing     Description: Goals to met by 1/9/2023    1. Sit to stand transfer with [place]  2. Bed to chair transfer with [place] using LRAD  3. Gait  x 150 feet with Supervision using LRAD   4. Ascend/descend 15 stair(s) with bilateral Handrails Stand-by Assistance using LRAD.   5. Stand for 10 minutes with Stand-by Assistance using LRAD  6. Lower extremity exercise program x15 reps per Instruction, with assistance as needed in order to facilitate improved postural control and improvement in functional independence                         Time Tracking:     PT Received On: 12/27/22  PT Start Time: 0113     PT Stop Time: 0135  PT Total Time (min): 22 min     Billable Minutes: Gait Training 22    Treatment Type:  Treatment  PT/PTA: PT     PTA Visit Number: 0     12/27/2022

## 2022-12-27 NOTE — SUBJECTIVE & OBJECTIVE
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     Review of Systems   Constitutional:  Negative for chills and fever.   HENT:  Negative for drooling and ear discharge.    Eyes:  Positive for visual disturbance. Negative for pain.   Respiratory:  Negative for cough and shortness of breath.    Cardiovascular:  Negative for chest pain and leg swelling.   Gastrointestinal:  Negative for abdominal distention and abdominal pain.   Endocrine: Negative for polydipsia and polyphagia.   Genitourinary:  Negative for dysuria and enuresis.   Musculoskeletal:  Negative for arthralgias and back pain.   Skin:  Negative for rash and wound.   Allergic/Immunologic: Negative for food allergies and immunocompromised state.   Neurological:  Positive for speech difficulty, numbness and headaches.   Hematological:  Does not bruise/bleed easily.   Psychiatric/Behavioral:  Negative for agitation and confusion.    Scheduled Meds:   aspirin  81 mg Oral Daily    atorvastatin  40 mg Oral Daily    enoxaparin  40 mg Subcutaneous Daily    magnesium sulfate IVPB  2 g Intravenous Once    mupirocin   Nasal BID     Continuous Infusions:   sodium chloride 0.9% 75 mL/hr at 12/26/22 1009    sodium chloride 0.9%       PRN Meds:acetaminophen, ALPRAZolam, labetaloL, methocarbamoL, ondansetron, sodium chloride 0.9%, sodium chloride 0.9%    Objective:     Vital Signs (Most Recent):  Temp: 98.9 °F (37.2 °C) (12/27/22 0750)  Pulse: 62 (12/27/22 0813)  Resp: 18 (12/27/22 0750)  BP: (!) 147/78 (12/27/22 0750)  SpO2: 98 % (12/27/22 0750)  BP Location: Right arm    Vital Signs Range (Last 24H):  Temp:  [97.5 °F (36.4 °C)-98.9 °F (37.2 °C)]   Pulse:  []   Resp:  [18-19]   BP: (126-148)/(55-94)   SpO2:  [93 %-99 %]   BP Location: Right arm    Physical Exam  Vitals and nursing note reviewed.   Constitutional:        Appearance: Normal appearance. She is obese.   HENT:      Head: Normocephalic and atraumatic.   Eyes:      Extraocular Movements:      Left eye: Abnormal extraocular motion present.      Conjunctiva/sclera: Conjunctivae normal.      Pupils: Pupils are equal, round, and reactive to light.      Comments: Improving mobility and conjugate gaze   Cardiovascular:      Rate and Rhythm: Normal rate and regular rhythm.   Pulmonary:      Effort: Pulmonary effort is normal.      Breath sounds: Normal breath sounds.   Abdominal:      General: Abdomen is flat. Bowel sounds are normal.      Palpations: Abdomen is soft.   Musculoskeletal:         General: Normal range of motion.      Cervical back: Normal range of motion.   Skin:     General: Skin is warm and dry.   Neurological:      Mental Status: She is alert.      Sensory: Sensory deficit present.      Comments: Dysarthria, ptosis     Psychiatric:         Mood and Affect: Mood normal.         Behavior: Behavior normal.       Neurological Exam:   LOC: alert  Attention Span: Good   Language: No aphasia  Articulation: Dysarthria  Orientation: Person, Place, Time   Visual Fields: Hemianopsia left  EOM (CN III, IV, VI): Ptosis bilaterally, disconjugate gaze, L. Eye ophthalmoplegia   Pupils (CN II, III): PERRL  Facial Sensation (CN V): Normal  Facial Movement (CN VII): Symmetric facial expression    Gag Reflex: present  Reflexes: flexor plantar responses bilaterally  Motor: Arm left  Normal 5/5  Leg left  Normal 5/5  Arm right  Normal 5/5  Leg right Normal 5/5  Cerebellum: Upper Extremity Appendicular Ataxia (Finger Nose Finger)  Left only when both eyes open  Sensation: Govind-hypoesthesia left  Tone: Normal tone throughout       Laboratory:  CMP:   Recent Labs   Lab 12/27/22  0536   CALCIUM 8.2*   ALBUMIN 3.3*   PROT 6.4      K 4.2   CO2 18*   *   BUN 11   CREATININE 0.7   ALKPHOS 72   ALT 18   AST 23   BILITOT 0.5       BMP:   Recent Labs   Lab 12/27/22  0536       K 4.2   *   CO2 18*   BUN 11   CREATININE 0.7   CALCIUM 8.2*       CBC:   Recent Labs   Lab 12/27/22  0535   WBC 6.94   RBC 4.52   HGB 13.1   HCT 41.5      MCV 92   MCH 29.0   MCHC 31.6*       Lipid Panel:   Recent Labs   Lab 12/25/22  0346   CHOL 179   LDLCALC 109.4   HDL 56   TRIG 68       Coagulation:   Recent Labs   Lab 12/25/22  0346   INR 1.0   APTT 25.0       Hgb A1C:   Recent Labs   Lab 12/25/22  0346   HGBA1C 4.8       TSH:   Recent Labs   Lab 12/25/22  0346   TSH 1.318         Diagnostic Results     Brain imaging:  CT Head w/o contrast 12-24-22 results:  No acute intracranial process identified     MRI Brain w/o contrast 12-25-22 results:    1.  Several apparent small subtle foci of restricted diffusion within the bilateral cerebellar hemispheres as well as additional punctate focus within the paramedian posterior right occipital lobe suspicious for punctate acute infarcts.  There is subtle asymmetric diffusion hyperintensity within the left lara lynn and additional region of acute ischemia not excluded.  Further evaluation and follow-up as warranted.     2.  Apparent small region of encephalomalacia within the left cerebellar hemisphere which may represent a remote infarct.  Additional possible punctate lacunar type infarct within the right superior cerebellar hemisphere.     3.  Abnormal T2 flow void involving the V3 segment of the right vertebral artery in this patient with suspected vertebral artery dissection.     4.  Several scattered foci of periventricular T2/FLAIR hyperintensity.  These lesions are nonspecific in appearance and may be seen with accelerated small vessel ischemic disease, vasculopathies, migraine headaches, and as the residua from inflammatory and traumatic insults to the brain.     Preliminary findings were discussed with Ede GAINES by myself at 03:31 on 12/25/2022.     Vessel Imaging:  CTA Head and neck 12-25-22 results:  1.  Abnormal segment of focal caliber change  with luminal narrowing and irregularity involving the V2 segment of the left vertebral artery.  Additionally, there is focal luminal narrowing and irregularity involving the V3 segment of the right vertebral artery.  Findings are concerning for possible bilateral vertebral artery dissection.  Vascular neurology consultation advised.  Further assessment could be performed with conventional angiography as warranted.     2.  Persistent asymmetric wedge-shaped region of parenchymal hypoattenuation within the left cerebellar hemisphere.  There is apparent contrast enhancement on the delayed series.  This is nonspecific although may relate to subacute infarct.  More sensitive assessment for ischemic could be performed with MRI if there are no patient contraindications.     3.  No evidence of cervical spine fracture.  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     Cardiac Evaluation:   EKG 12-24-22 results:  Normal sinus rhythm  Normal ECG  No previous ECGs available    TTE 12/25/22  The left ventricle is normal in size with normal systolic function.  The estimated ejection fraction is 65%.  Normal left ventricular diastolic function.  Normal right ventricular size with normal right ventricular systolic function.  Normal central venous pressure (3 mmHg).  The estimated PA systolic pressure is 27 mmHg.

## 2022-12-27 NOTE — NURSING
Patient is currently on her menses, she receives daily ASA 81 as well as scheduled Lovenox. She is passing large blood clots when urinating. Talked with patient and she states this is normal for her.

## 2022-12-27 NOTE — PLAN OF CARE
Problem: Communication Impairment (Stroke, Ischemic/Transient Ischemic Attack)  Goal: Improved Communication Skills  Outcome: Ongoing, Progressing     Problem: Functional Ability Impaired (Stroke, Ischemic/Transient Ischemic Attack)  Goal: Optimal Functional Ability  Outcome: Ongoing, Progressing     Problem: Swallowing Impairment (Stroke, Ischemic/Transient Ischemic Attack)  Goal: Optimal Eating and Swallowing without Aspiration  Outcome: Ongoing, Progressing     Problem: Fall Injury Risk  Goal: Absence of Fall and Fall-Related Injury  Outcome: Ongoing, Progressing     Problem: Adult Inpatient Plan of Care  Goal: Plan of Care Review  Outcome: Ongoing, Progressing     Problem: Adult Inpatient Plan of Care  Goal: Patient-Specific Goal (Individualized)  Outcome: Ongoing, Progressing     Problem: Adult Inpatient Plan of Care  Goal: Optimal Comfort and Wellbeing  Outcome: Ongoing, Progressing  Patient is resting quietly with no s/sx of distress noted.  remains at bedside and is very attentive to her needs. She is alert and answers all questions appropriately but speech is thick and slurred. Gets up with assist times 2 to BSC, her movements are jerking and at times almost spastic. Is currently on her menses and denies cramps/pain. Has NS 0.9% running at 75 ml/hr. Spoke with  regarding the plan for her upon discharge, states surgery will not help her so they are thinking rehab and with time and rehab her sx's should resolve. Bed in lowest position with SR's up times 3 and call light in place. Declined bed alarm since  is at the bedside. SCD's in place

## 2022-12-27 NOTE — PLAN OF CARE
MBS completed. Recommend regular diet and NECTAR thick liquids. No straws and aspiration precautions.

## 2022-12-27 NOTE — PLAN OF CARE
[facility] (Hospital)  Initial Discharge Assessment       Primary Care Provider: Hannah Clements MD    Admission Diagnosis: Vertebral artery dissection [I77.74]  Stroke [I63.9]    Admission Date: 12/25/2022  Expected Discharge Date: 12/28/2022         Payor: [place] [employer] / Plan: [facility] RESOURCES ([facility]) / Product Type: Commercial /     Extended Emergency Contact Information  Primary Emergency Contact: Joseph Angeles  Address: [address] [place] [address] [place] [address] [facility]  Home Phone: [phone]  Mobile Phone: [phone]  Relation: Spouse  Secondary Emergency Contact: Mary Villar  Address: [address] [place] [address] [facility]  Home Phone: [phone]  Mobile Phone: [phone]  Relation: Friend    Discharge Plan A: (P) Rehab         [id number] - [place], [place] - [address]  [address]  [facility] [address]  Phone: [phone] Fax: [phone]      Initial Assessment (most recent)       Adult Discharge Assessment - 12/27/22 1142          Discharge Assessment    Assessment Type Discharge Planning Assessment (P)      Confirmed/corrected address, phone number and insurance Yes (P)      Confirmed Demographics Correct on Facesheet (P)      Source of Information patient;family (P)      If unable to respond/provide information was family/caregiver contacted? Yes (P)      Contact Name/Number Sarina Ruiz [phone] (P)      When was your last doctors appointment? 12/21/22 (P)      Communicated JOVANI with patient/caregiver Yes (P)      Reason For Admission vertebral artery dissection (P)      People in Home spouse;child(mary), dependent (P)      Facility Arrived From: home (P)      Do you expect to return to your current living situation? Other (see comments) (P)    rehab    Do you have help at home or someone to help you manage your care at home? Yes (P)      Who are your caregiver(s) and their phone  number(s)?  Joseph (P)      Prior to hospitilization cognitive status: Alert/Oriented (P)      Current cognitive status: Alert/Oriented (P)      Walking or Climbing Stairs ambulation difficulty, assistance 1 person (P)      Equipment Currently Used at Home none (P)      Readmission within 30 days? No (P)      Patient currently being followed by outpatient case management? No (P)      Do you currently have service(s) that help you manage your care at home? No (P)      Do you take prescription medications? Yes (P)      Do you have prescription coverage? Yes (P)      Coverage [employer] (P)      Do you have any problems affording any of your prescribed medications? No (P)      Is the patient taking medications as prescribed? yes (P)      Who is going to help you get home at discharge?  Joseph (P)      How do you get to doctors appointments? car, drives self;family or friend will provide (P)      Are you on dialysis? No (P)      Do you take coumadin? No (P)      Discharge Plan A Rehab (P)      DME Needed Upon Discharge  walker, standard (P)      Discharge Plan discussed with: Spouse/sig other;Patient (P)         Social Connections    Are you , , , , never , or living with a partner?  (P)                       BIJAN met with patient and her  Joseph at bedside.  Patient lives in a 2 story home with her  and 2 children, ages 16 and 5.  She does not have any DME or HH at home and is not on coumadin or HD.  Prior to her admission, she was independent. Therapy recommending Rehab and BIJAN sent referral to [facility] [place], as the patient lives near close to [facility] to see if they are in network.  If they are not, BIJAN will discuss other options with patient and family.      Deneen Chery, ANA ROSA  [facility]  [phone]

## 2022-12-27 NOTE — HOSPITAL COURSE
36 y/o female with 3 week history of headache and cracked her neck tonight and headache got worse plus she started with dysarthri, had Ptosis nina. Seen in [facility] ED and no thrombolytics due to concern for discestion so transferred to [facility] ED fopr further evaluation and CTA head and neck reveals Abnormal segment of focal caliber change with luminal narrowing and irregularity involving the V2 segment of the left vertebral artery.  Additionally, there is focal luminal narrowing and irregularity involving the V3 segment of the right vertebral artery. MRI Brain reveals several apparent small subtle foci of restricted diffusion within the bilateral cerebellar hemispheres as well as additional punctate focus within the paramedian posterior right occipital lobe suspicious for punctate acute infarcts.  There is subtle asymmetric diffusion hyperintensity within the left lara lynn and additional region of acute ischemia not excluded. Physical deficits slowly improved throughout hospitalization. She was still not safe to walk at discharge and required wheelchair. Biggest complaints were headache, double vision, poor coordination of legs and slurred speech. Pseudobulbar affect noted but patient and family would prefer to wait to see if affect will normalize post-discharge. Patient discharged to rehab today.

## 2022-12-27 NOTE — PLAN OF CARE
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       Allergies:  Review of patient's allergies indicates:   Allergen Reactions    Promethazine Other (See Comments)     Nervous reaction       Diet: regular diet and 2 gram sodium diet    Activities: Activity as tolerated    Goals of Care Treatment Preferences:  Code Status: Full Code      Nursing: Assistance with walking due to vision and ataxia       CONSULTS:    Physical Therapy to evaluate and treat. , Occupational Therapy to evaluate and treat., Speech Therapy to evaluate and treat for Language and Swallowing., and  to evaluate for community resources/long-range planning.    MISCELLANEOUS CARE:    WOUND CARE ORDERS  None    Medications: Review discharge medications with patient and family and provide education.      Current Discharge Medication List        START taking these medications    Details   aspirin (ECOTRIN) 81 MG EC tablet Take 1 tablet (81 mg total) by mouth once daily.  Qty: 360 tablet, Refills: 0      atorvastatin (LIPITOR) 40 MG tablet Take 1 tablet (40 mg total) by mouth once daily.  Qty: 90 tablet, Refills: 3           CONTINUE these medications which have NOT CHANGED    Details   ondansetron (ZOFRAN-ODT) 8 MG TbDL LET 1 TABLET DISSOLVE TWICE A DAY AS NEEDED FOR  NAUSEA  Qty: 20 tablet, Refills: 1           STOP taking these medications       butalbital-acetaminophen-caffeine -40 mg (FIORICET, ESGIC) -40 mg per tablet Comments:   Reason for Stopping:                  Immunizations Administered as of 12/27/2022       Name Date Dose VIS Date Route Exp Date    COVID-19, MRNA, LN-S, PF (Pfizer) (Purple Cap) 9/30/2021 0.3 mL -- Intramuscular --    Site: Left deltoid     : Pf[employer] Inc     Lot: [id number]     COVID-19, MRNA, LN-S, PF (Pfizer) (Purple Cap) 3/29/2021 11:45 AM 0.3 mL 12/12/2020 Intramuscular 7/31/2021    Site: Left deltoid     Given By: Sera Mirza RN     : [employer]     Lot: [id number]     COVID-19, MRNA, LN-S, PF (Pfizer) (Purple Cap) 3/8/2021  3:57 PM 0.3 mL 12/12/2020 Intramuscular 6/30/2021    Site: Left deltoid     Given By: Tracy Subramanian RN     : Pf[employer] Inc     Lot: [id number]             Some patients may experience side effects after vaccination.  These may include fever, headache, muscle or joint aches.  Most symptoms resolve with 24-48 hours and do not require urgent medical evaluation unless they persist for more than 72 hours or symptoms are concerning for an unrelated medical condition.          _________________________________  Sundar Norwood MD  12/27/2022

## 2022-12-27 NOTE — ASSESSMENT & PLAN NOTE
38 y/o female with 3 week history of headache and cracked her neck tonight and headache got worse plus she started with dysarthri, had Ptosis nina. Seen in [facility] ED and no thrombolytics due to concern for discestion so transferred to [facility] ED fopr further evaluation and CTA head and neck reveals Abnormal segment of focal caliber change with luminal narrowing and irregularity involving the V2 segment of the left vertebral artery.  Additionally, there is focal luminal narrowing and irregularity involving the V3 segment of the right vertebral artery. MRI Brain reveals Several apparent small subtle foci of restricted diffusion within the bilateral cerebellar hemispheres as well as additional punctate focus within the paramedian posterior right occipital lobe suspicious for punctate acute infarcts.  There is subtle asymmetric diffusion hyperintensity within the left lara lynn and additional region of acute ischemia not excluded.     Antithrombotics: ASA 81 mg daily    Statins: Lipitor 40 mg daily    Aggressive risk factor modification: Diet, Exercise, Obesity, cracking her neck almost daily     Rehab efforts: The patient has been evaluated by a stroke team provider and the therapy needs have been fully considered based off the presenting complaints and exam findings. The following therapy evaluations are needed: PT evaluate and treat, OT evaluate and treat, SLP evaluate and treat    Diagnostics ordered/pending: Other: MBBS    VTE prophylaxis: Enoxaparin 40 mg SQ every 24 hours  Mechanical prophylaxis: Place SCDs    BP parameters: Infarct: No intervention, SBP <180

## 2022-12-27 NOTE — PROGRESS NOTES
[facility] Neurosurgery ([facility])  Vascular Neurology  Comprehensive Stroke Center  Progress Note    Assessment/Plan:     * Class 2 obesity due to excess calories with body mass index (BMI) of 37.0 to 37.9 in adult  Stroke risk factor   on diet and exercise    New infarction of cerebellum  36 y/o female with 3 week history of headache and cracked her neck tonight and headache got worse plus she started with dysarthri, had Ptosis nina. Seen in [facility] ED and no thrombolytics due to concern for discestion so transferred to [facility] ED fopr further evaluation and CTA head and neck reveals Abnormal segment of focal caliber change with luminal narrowing and irregularity involving the V2 segment of the left vertebral artery.  Additionally, there is focal luminal narrowing and irregularity involving the V3 segment of the right vertebral artery. MRI Brain reveals Several apparent small subtle foci of restricted diffusion within the bilateral cerebellar hemispheres as well as additional punctate focus within the paramedian posterior right occipital lobe suspicious for punctate acute infarcts.  There is subtle asymmetric diffusion hyperintensity within the left lara lynn and additional region of acute ischemia not excluded.     Antithrombotics: ASA 81 mg daily    Statins: Lipitor 40 mg daily    Aggressive risk factor modification: Diet, Exercise, Obesity, cracking her neck almost daily     Rehab efforts: The patient has been evaluated by a stroke team provider and the therapy needs have been fully considered based off the presenting complaints and exam findings. The following therapy evaluations are needed: PT evaluate and treat, OT evaluate and treat, SLP evaluate and treat    Diagnostics ordered/pending: Other: MBBS    VTE prophylaxis: Enoxaparin 40 mg SQ every 24 hours  Mechanical prophylaxis: Place SCDs    BP parameters: Infarct: No intervention, SBP <180      Vertebral artery dissection  See below  stroke    Ptosis, bilateral  Due to stroke  Resolving partially         12/26/2022- VSS. Neuroexam stable. NPO for concern of aspiration  12/27/2022- VSS, Neuroexam stable. MBBS today. Diet advanced to regular diet and nectar thick.      STROKE DOCUMENTATION   Acute Stroke Times   Last Known Normal Date: 12/24/22  Last Known Normal Time: 2200  Symptom Onset Date: 12/24/20  Symptom Onset Time: 2200  Stroke Team Called Date: 12/25/22  Stroke Team Called Time: 0124  Stroke Team Arrival Date: 12/25/20  Stroke Team Arrival Time: 0130  CT Interpretation Time: 0000  Thrombolytic Therapy Recommended: No  CTA Interpretation Time: 0152  Thrombectomy Recommended: No    NIH Scale:  1a. Level of Consciousness: 0-->Alert, keenly responsive  1b. LOC Questions: 0-->Answers both questions correctly  1c. LOC Commands: 0-->Performs both tasks correctly  2. Best Gaze: 1-->Partial gaze palsy, gaze is abnormal in one or both eyes, but forced deviation or total gaze paresis is not present  3. Visual: 1-->Partial hemianopia  4. Facial Palsy: 0-->Normal symmetrical movements  5a. Motor Arm, Left: 0-->No drift, limb holds 90 (or 45) degrees for full 10 secs  5b. Motor Arm, Right: 0-->No drift, limb holds 90 (or 45) degrees for full 10 secs  6a. Motor Leg, Left: 0-->No drift, leg holds 30 degree position for full 5 secs  6b. Motor Leg, Right: 0-->No drift, leg holds 30 degree position for full 5 secs  7. Limb Ataxia: 0-->Absent  8. Sensory: 1-->Mild-to-moderate sensory loss, patient feels pinprick is less sharp or is dull on the affected side, or there is a loss of superficial pain with pinprick, but patient is aware of being touched  9. Best Language: 0-->No aphasia, normal  10. Dysarthria: 1-->Mild-to-moderate dysarthria, patient slurs at least some words and, at worst, can be understood with some difficulty  11. Extinction and Inattention (formerly Neglect): 0-->No abnormality  Total (NIH Stroke Scale): 4       Modified [place] Score:  0  [place] Coma Scale:    ABCD2 Score:    [id number] Score:   HAS -BLED Score:   ICH Score:   Hunt & Cali Classification:      Hemorrhagic change of an Ischemic Stroke: Does this patient have an ischemic stroke with hemorrhagic changes? No     Neurologic Chief Complaint: Bilateral Vertebral Artery dissections    Subjective:     HPI, Past Medical, Family, and Social History remains the same as documented in the initial encounter.     Review of Systems   Constitutional:  Negative for chills and fever.   HENT:  Negative for drooling and ear discharge.    Eyes:  Positive for visual disturbance. Negative for pain.   Respiratory:  Negative for cough and shortness of breath.    Cardiovascular:  Negative for chest pain and leg swelling.   Gastrointestinal:  Negative for abdominal distention and abdominal pain.   Endocrine: Negative for polydipsia and polyphagia.   Genitourinary:  Negative for dysuria and enuresis.   Musculoskeletal:  Negative for arthralgias and back pain.   Skin:  Negative for rash and wound.   Allergic/Immunologic: Negative for food allergies and immunocompromised state.   Neurological:  Positive for speech difficulty, numbness and headaches.   Hematological:  Does not bruise/bleed easily.   Psychiatric/Behavioral:  Negative for agitation and confusion.    Scheduled Meds:   aspirin  81 mg Oral Daily    atorvastatin  40 mg Oral Daily    enoxaparin  40 mg Subcutaneous Daily    magnesium sulfate IVPB  2 g Intravenous Once    mupirocin   Nasal BID     Continuous Infusions:   sodium chloride 0.9% 75 mL/hr at 12/26/22 1009    sodium chloride 0.9%       PRN Meds:acetaminophen, ALPRAZolam, labetaloL, methocarbamoL, ondansetron, sodium chloride 0.9%, sodium chloride 0.9%    Objective:     Vital Signs (Most Recent):  Temp: 98.9 °F (37.2 °C) (12/27/22 0750)  Pulse: 62 (12/27/22 0813)  Resp: 18 (12/27/22 0750)  BP: (!) 147/78 (12/27/22 0750)  SpO2: 98 % (12/27/22 0750)  BP Location: Right arm    Vital Signs  Range (Last 24H):  Temp:  [97.5 °F (36.4 °C)-98.9 °F (37.2 °C)]   Pulse:  []   Resp:  [18-19]   BP: (126-148)/(55-94)   SpO2:  [93 %-99 %]   BP Location: Right arm    Physical Exam  Vitals and nursing note reviewed.   Constitutional:       Appearance: Normal appearance. She is obese.   HENT:      Head: Normocephalic and atraumatic.   Eyes:      Extraocular Movements:      Left eye: Abnormal extraocular motion present.      Conjunctiva/sclera: Conjunctivae normal.      Pupils: Pupils are equal, round, and reactive to light.      Comments: Improving mobility and conjugate gaze   Cardiovascular:      Rate and Rhythm: Normal rate and regular rhythm.   Pulmonary:      Effort: Pulmonary effort is normal.      Breath sounds: Normal breath sounds.   Abdominal:      General: Abdomen is flat. Bowel sounds are normal.      Palpations: Abdomen is soft.   Musculoskeletal:         General: Normal range of motion.      Cervical back: Normal range of motion.   Skin:     General: Skin is warm and dry.   Neurological:      Mental Status: She is alert.      Sensory: Sensory deficit present.      Comments: Dysarthria, ptosis     Psychiatric:         Mood and Affect: Mood normal.         Behavior: Behavior normal.       Neurological Exam:   LOC: alert  Attention Span: Good   Language: No aphasia  Articulation: Dysarthria  Orientation: Person, Place, Time   Visual Fields: Hemianopsia left  EOM (CN III, IV, VI): Ptosis bilaterally, disconjugate gaze, L. Eye ophthalmoplegia   Pupils (CN II, III): PERRL  Facial Sensation (CN V): Normal  Facial Movement (CN VII): Symmetric facial expression    Gag Reflex: present  Reflexes: flexor plantar responses bilaterally  Motor: Arm left  Normal 5/5  Leg left  Normal 5/5  Arm right  Normal 5/5  Leg right Normal 5/5  Cerebellum: Upper Extremity Appendicular Ataxia (Finger Nose Finger)  Left only when both eyes open  Sensation: Govind-hypoesthesia left  Tone: Normal tone throughout        Laboratory:  CMP:   Recent Labs   Lab 12/27/22  0536   CALCIUM 8.2*   ALBUMIN 3.3*   PROT 6.4      K 4.2   CO2 18*   *   BUN 11   CREATININE 0.7   ALKPHOS 72   ALT 18   AST 23   BILITOT 0.5       BMP:   Recent Labs   Lab 12/27/22  0536      K 4.2   *   CO2 18*   BUN 11   CREATININE 0.7   CALCIUM 8.2*       CBC:   Recent Labs   Lab 12/27/22  0535   WBC 6.94   RBC 4.52   HGB 13.1   HCT 41.5      MCV 92   MCH 29.0   MCHC 31.6*       Lipid Panel:   Recent Labs   Lab 12/25/22  0346   CHOL 179   LDLCALC 109.4   HDL 56   TRIG 68       Coagulation:   Recent Labs   Lab 12/25/22  0346   INR 1.0   APTT 25.0       Hgb A1C:   Recent Labs   Lab 12/25/22  0346   HGBA1C 4.8       TSH:   Recent Labs   Lab 12/25/22  0346   TSH 1.318         Diagnostic Results     Brain imaging:  CT Head w/o contrast 12-24-22 results:  No acute intracranial process identified     MRI Brain w/o contrast 12-25-22 results:    1.  Several apparent small subtle foci of restricted diffusion within the bilateral cerebellar hemispheres as well as additional punctate focus within the paramedian posterior right occipital lobe suspicious for punctate acute infarcts.  There is subtle asymmetric diffusion hyperintensity within the left lara lynn and additional region of acute ischemia not excluded.  Further evaluation and follow-up as warranted.     2.  Apparent small region of encephalomalacia within the left cerebellar hemisphere which may represent a remote infarct.  Additional possible punctate lacunar type infarct within the right superior cerebellar hemisphere.     3.  Abnormal T2 flow void involving the V3 segment of the right vertebral artery in this patient with suspected vertebral artery dissection.     4.  Several scattered foci of periventricular T2/FLAIR hyperintensity.  These lesions are nonspecific in appearance and may be seen with accelerated small vessel ischemic disease, vasculopathies, migraine headaches, and  as the residua from inflammatory and traumatic insults to the brain.     Preliminary findings were discussed with Ede GAINES by myself at 03:31 on 12/25/2022.     Vessel Imaging:  CTA Head and neck 12-25-22 results:  1.  Abnormal segment of focal caliber change with luminal narrowing and irregularity involving the V2 segment of the left vertebral artery.  Additionally, there is focal luminal narrowing and irregularity involving the V3 segment of the right vertebral artery.  Findings are concerning for possible bilateral vertebral artery dissection.  Vascular neurology consultation advised.  Further assessment could be performed with conventional angiography as warranted.     2.  Persistent asymmetric wedge-shaped region of parenchymal hypoattenuation within the left cerebellar hemisphere.  There is apparent contrast enhancement on the delayed series.  This is nonspecific although may relate to subacute infarct.  More sensitive assessment for ischemic could be performed with MRI if there are no patient contraindications.     3.  No evidence of cervical spine fracture.  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     Cardiac Evaluation:   EKG 12-24-22 results:  Normal sinus rhythm  Normal ECG  No previous ECGs available    TTE 12/25/22   The left ventricle is normal in size with normal systolic function.   The estimated ejection fraction is 65%.   Normal left ventricular diastolic function.   Normal right ventricular size with normal right ventricular systolic function.   Normal central venous pressure (3 mmHg).   The estimated PA systolic pressure is 27 mmHg.            Sundar Norwood MD  Comprehensive Stroke Center  Department of Vascular Neurology   [facility] Neurosurgery [facility])

## 2022-12-28 PROCEDURE — 25000003 PHARM REV CODE 250: Performed by: NURSE PRACTITIONER

## 2022-12-28 PROCEDURE — 94761 N-INVAS EAR/PLS OXIMETRY MLT: CPT

## 2022-12-28 PROCEDURE — 11000001 HC ACUTE MED/SURG PRIVATE ROOM

## 2022-12-28 PROCEDURE — 25000003 PHARM REV CODE 250: Performed by: PSYCHIATRY & NEUROLOGY

## 2022-12-28 PROCEDURE — 92526 ORAL FUNCTION THERAPY: CPT

## 2022-12-28 PROCEDURE — 99233 PR SUBSEQUENT HOSPITAL CARE,LEVL III: ICD-10-PCS | Mod: ,,, | Performed by: PSYCHIATRY & NEUROLOGY

## 2022-12-28 PROCEDURE — 92523 SPEECH SOUND LANG COMPREHEN: CPT

## 2022-12-28 PROCEDURE — 97116 GAIT TRAINING THERAPY: CPT

## 2022-12-28 PROCEDURE — 63600175 PHARM REV CODE 636 W HCPCS: Performed by: NURSE PRACTITIONER

## 2022-12-28 PROCEDURE — 99233 SBSQ HOSP IP/OBS HIGH 50: CPT | Mod: ,,, | Performed by: PSYCHIATRY & NEUROLOGY

## 2022-12-28 PROCEDURE — 97535 SELF CARE MNGMENT TRAINING: CPT

## 2022-12-28 PROCEDURE — 97112 NEUROMUSCULAR REEDUCATION: CPT

## 2022-12-28 RX ADMIN — MUPIROCIN: 20 OINTMENT TOPICAL at 08:12

## 2022-12-28 RX ADMIN — ENOXAPARIN SODIUM 40 MG: 40 INJECTION SUBCUTANEOUS at 05:12

## 2022-12-28 RX ADMIN — ATORVASTATIN CALCIUM 40 MG: 20 TABLET, FILM COATED ORAL at 08:12

## 2022-12-28 RX ADMIN — ASPIRIN 81 MG: 81 TABLET, COATED ORAL at 08:12

## 2022-12-28 NOTE — PT/OT/SLP EVAL
Speech Language Pathology Evaluation  Cognitive Communication  And Dysphagia Treatment    Patient Name:  Madeline Carlton Ma   MRN:  [id number]  Admitting Diagnosis: Class 2 obesity due to excess calories with body mass index (BMI) of 37.0 to 37.9 in adult    Recommendations:     Recommendations:                General Recommendations:  Dysphagia therapy and Speech/language therapy  Diet recommendations:  Regular Diet - IDDSI Level 7, [place] Thick   Aspiration Precautions: 1 bite/sip at a time, Assistance with thickening liquids, Eliminate distractions, Feed only when awake/alert, Frequent oral care, HOB to 90 degrees, No straws, Remain upright 30 minutes post meal, Small bites/sips, and Standard aspiration precautions   General Precautions: Standard, aspiration, fall, nectar thick  Communication strategies:  none    History:     Past Medical History:   Diagnosis Date    Migraine headache        Past Surgical History:   Procedure Laterality Date     SECTION  2006    MANDIBLE FRACTURE SURGERY         Social History: Patient lives with spouse and 2 children.    Prior Intubation HX:  none this admit    Modified Barium Swallow: 22-   Oral Preparation/Oral Phase  WFL- Pt with adequate bolus acceptance, containment, control and timely A-P transfer across consistencies    Pharyngeal Phase   Pt with silent aspiration of thin liquids during cyclical drinking and aspiration with a cough on 2nd trial of thins with cyclical drinking. No aspiration with single cup sips. However, pt unable to clear aspirated material despite cues to cough. Pt with no penetration or aspiration noted following nectar thick liquids, pureed or solids. Pt with trace valleculae stasis that cleared with cues to dry swallow. Occasional dry swallows noted.      Chest X-Rays: none recent    Prior diet: regular/thin    Occupation/hobbies/homemaking: Employed as a , worked from home, Independent with ADLs prior to admit  ..      Subjective     Spoke with RN prior to entering pt's room . Pt seen bedside for session with spouse present. Alert and agreeable to ST.       Pain/Comfort:  Pain Rating 1: 0/10  Pain Rating Post-Intervention 1: 0/10    Respiratory Status: Room air    Objective:   Cognitive Status:    Arousal/Alertness Appropriate response to stimuli  Attention No obvious deficits observed    Orientation Oriented x4  Memory DelayedWFL, recall WFL, long term recall WFL, and recall general information WFL  Problem Solving WFL  Safety awareness WFL  Simple calculation WFL  Reasoning Deductive reasoning WFL       Receptive Language:   Comprehension:      WFL    Pragmatics:    WFL    Expressive Language:  Verbal:    Naming Convergent WFL, Divergent responsive WFL, and Single word responsive naming WFL  Sentence formulation WFL  Conversational speech WFL      Motor Speech:  Dysarthria mild, c/b slowed speech with decreased articulatory precision    Voice:   WFL    Visual-Spatial:  Pt c/o double vision in left eye but able to read signs around room without difficulty    Reading:   WFL     Written Expression:   Dominant hand: Right  Assessment hand: Right  micrographia    Treatment: Pt also participated in dysphagia therapy. Discussed results of MBSS, including silent aspiration with cyclical sips of thins and recommendations for regular diet with nectar thick liquids. Pt accepted ~4 oz thin liquids via small cup sips. Overt cough noted x1 mid way through trials on larger sip. No other observable s/sx airway threat, but unable to r/o silent aspiration at bedside. Provided education to pt and spouse re: role of ST, POC, diet recs, swallow precautions, dysarthria, and clear speech strategies. They verbalized understanding. At end of session, pt remained bedside with call light and all needs in reach. White board updated.      Assessment:   Madeline Carlton Ma is a 37 y.o. female with an SLP diagnosis of Dysphagia and Dysarthria.       Goals:   Multidisciplinary Problems       SLP Goals          Problem: SLP    Goal Priority Disciplines Outcome   SLP Goal     SLP Ongoing, Progressing   Description: Speech Language Pathology Goals  Goals expected to be met by 1/1/23    1.Pt will participate in ongoing assessment of swallow function to determine safest, least restrictive means of nutrition/hydration  2. Educate Pt and family on aspiration precautions and SLP POC  3. Pt will participate in further assessment of speech, language and cognition to determine ongoing POC needs MET  Additional Goals  4. Pt will utilize clear speech strategies at conversation level to improve intelligibility to 100% given supervision.                        Plan:   Patient to be seen:  4 x/week   Plan of Care expires:  01/23/23  Plan of Care reviewed with:  patient, spouse   SLP Follow-Up:  Yes       Discharge recommendations:  Discharge Facility/Level of Care Needs: rehabilitation facility   Barriers to Discharge:  Level of Skilled Assistance Needed      Time Tracking:   SLP Treatment Date:   12/28/22  Speech Start Time:  1038  Speech Stop Time:  1057     Speech Total Time (min):  19 min    Billable Minutes: Eval 10  and Treatment Swallowing Dysfunction 9    12/28/2022

## 2022-12-28 NOTE — PLAN OF CARE
Problem: Adjustment to Illness (Stroke, Ischemic/Transient Ischemic Attack)  Goal: Optimal Coping  Outcome: Ongoing, Progressing     Problem: Functional Ability Impaired (Stroke, Ischemic/Transient Ischemic Attack)  Goal: Optimal Functional Ability  Outcome: Ongoing, Progressing     Problem: Swallowing Impairment (Stroke, Ischemic/Transient Ischemic Attack)  Goal: Optimal Eating and Swallowing without Aspiration  Outcome: Ongoing, Progressing     Problem: Urinary Elimination Impaired (Stroke, Ischemic/Transient Ischemic Attack)  Goal: Effective Urinary Elimination  Outcome: Ongoing, Progressing     Problem: Fall Injury Risk  Goal: Absence of Fall and Fall-Related Injury  Outcome: Ongoing, Progressing     Problem: Infection  Goal: Absence of Infection Signs and Symptoms  Outcome: Ongoing, Progressing  POC and meds reviewed with patient and spouse.All concerns addressed.Will continue to monitor.

## 2022-12-28 NOTE — PLAN OF CARE
12/28/22 0944   Post-Acute Status   Post-Acute Authorization Placement   Post-Acute Placement Status Pending payor review/awaiting authorization (if required)   Coverage UMR   Discharge Plan   Discharge Plan A Rehab     SW met with patient and her .  They have chosen [facility] in [place] and they have accepted and have submitted for auth.  SW will continue to follow.      Deneen Chery, ANA ROSA  [facility]  [phone]

## 2022-12-28 NOTE — PLAN OF CARE
Cognitive-linguistic evaluation completed. Pt's cognition and language skills appear to be intact. She presents with mild dysarthria. Continue regular diet with nectar thick liquids per MBSS recs. Recommend continued skilled ST at next  level of care. Rani Wallace CCC-SLP 12/28/2022 1:11 PM

## 2022-12-28 NOTE — PLAN OF CARE
Problem: Adjustment to Illness (Stroke, Ischemic/Transient Ischemic Attack)  Goal: Optimal Coping  Outcome: Ongoing, Progressing     Problem: Bowel Elimination Impaired (Stroke, Ischemic/Transient Ischemic Attack)  Goal: Effective Bowel Elimination  Outcome: Ongoing, Progressing     Problem: Communication Impairment (Stroke, Ischemic/Transient Ischemic Attack)  Goal: Improved Communication Skills  Outcome: Ongoing, Progressing     Problem: Functional Ability Impaired (Stroke, Ischemic/Transient Ischemic Attack)  Goal: Optimal Functional Ability  Outcome: Ongoing, Progressing     Problem: Sensorimotor Impairment (Stroke, Ischemic/Transient Ischemic Attack)  Goal: Improved Sensorimotor Function  Outcome: Ongoing, Progressing   Poc and meds reviewed with patient and spouse.Patient is AA0x4, up to bedside commode with assistance.No c/o pain or discomfort.Bed is in low position,  bed rails upX2, call light within reach and bed alarm.

## 2022-12-28 NOTE — PLAN OF CARE
"  Problem: Adjustment to Illness (Stroke, Ischemic/Transient Ischemic Attack)  Goal: Optimal Coping  Outcome: Ongoing, Progressing     Problem: Communication Impairment (Stroke, Ischemic/Transient Ischemic Attack)  Goal: Improved Communication Skills  Outcome: Ongoing, Progressing     Problem: Functional Ability Impaired (Stroke, Ischemic/Transient Ischemic Attack)  Goal: Optimal Functional Ability  Outcome: Ongoing, Progressing     Problem: Swallowing Impairment (Stroke, Ischemic/Transient Ischemic Attack)  Goal: Optimal Eating and Swallowing without Aspiration  Outcome: Ongoing, Progressing     Problem: Fall Injury Risk  Goal: Absence of Fall and Fall-Related Injury  Outcome: Ongoing, Progressing     Problem: Adult Inpatient Plan of Care  Goal: Readiness for Transition of Care  Outcome: Ongoing, Progressing     Problem: Adult Inpatient Plan of Care  Goal: Patient-Specific Goal (Individualized)  Outcome: Ongoing, Progressing     Problem: Adult Inpatient Plan of Care  Goal: Plan of Care Review  Outcome: Ongoing, Progressing   Patient has slept off and on with no c/o pain or discomfort. HOB elevated, C-Collar in place. SR"s up times 3 and call light in place for safety purposes.  remains at the bedside and is very attentive. Continues NS 0.9% at 75 ml/hr continuously as ordered, remains on [place] thick water but diet was upgraded to regular. Up to BSC with 1-2 assist, she is very "wobbly" when transfering and lower limbs continue with spasticity. Is showing improvement verbally but gets fatigued easily. VSS, flow sheets completed see for assessments. SCD's in place.  "

## 2022-12-28 NOTE — PT/OT/SLP PROGRESS
Physical Therapy Treatment    Patient Name:  Madeline Carlton Ma   MRN:  [id number]    Recent Surgery: * No surgery found *      Recommendations:     Discharge Recommendations:  rehabilitation facility   Discharge Equipment Recommendations: walker, rolling, shower chair   Barriers to discharge: None    Highest Level of Mobility: Gait 130'  Assistance Required: Min(A) w/ RW    Assessment:     Madeline Carlton Ma is a 37 y.o. female admitted with a medical diagnosis of Class 2 obesity due to excess calories with body mass index (BMI) of 37.0 to 37.9 in adult.    Pt met with HOB elevated, spouse present and agreeable to PT treatment. Today's PT treatment focus was on continued gait training to improve function. Pt continues to demo an ataxic gait, which worsened with pt fatigue. She has difficulty maintaining a straight path and deviates frequently to the R. In addition, pt is impulsive with movement and has poor safety awareness. She is a high fall risk at this time. Pt is very motivated to work with therapy and follows all commands appropriately.     Pt is progressing towards acute PT goals appropriately and continues to benefit from acute PT sessions. After hospital discharge, pt would benefit from inpatient rehab to maximize rehab potential.    Rehab Prognosis: Good; patient would benefit from acute skilled PT services to address these deficits and reach maximum level of function.      Plan:     During this hospitalization, patient to be seen 4 x/week to address the identified rehab impairments via gait training, therapeutic activities, therapeutic exercises, neuromuscular re-education and progress toward the following goals:    Plan of Care Expires:  01/25/23    This plan of care has been discussed with the patient/caregiver, who was included in its development and is in agreement with the identified goals and treatment plan.     Subjective     Communicated with RN prior to session.  Patient agreeable to  participate.     Pain/Comfort:  Pain Rating 1: 0/10  Pain Rating Post-Intervention 1: 0/10    Chief Complaint: Weakness, poor coordination  Patient/Family Comments/goals: To go to rehab      Objective:     Patient found HOB elevated with telemetry, peripheral IV  upon PT entry to room.    General Precautions: Standard, aspiration, fall, nectar thick   Orthopedic Precautions:N/A   Braces: Cervical collar         Exams:    Cognition:  Patient is oriented to Person, Place, Time, Situation  Follows multistep  commands   Insight to deficits/safety awareness: impaired, pt is impulsive and has poor insight to deficits    Functional Mobility:    Bed Mobility:  Supine to Sit: Contact Guard Assistance on L side of bed  Sit to Supine: Contact Guard Assistance  Scooting anteriorly to EOB to plant feet on floor: Contact Guard Assistance    Transfers:   Sit to Stand Transfer: Stand-by Assistance  from EOB with RW AD   PT provided cues for hand placement  Toilet t/f: CGA for eccentric descent and CGA to rise             Gait:  Patient received gait training in hallway 130 feet with Minimal Assistance and rolling walker  Gait Assessment: unsteady gait, decreased step length, narrow base of support, decreased weight shift, ambulates outside ANDREIA of RW, flexed posture, decreased miquel, and ataxic gait.   Gait Pattern Observed: Step-to  Comments: All lines remained intact throughout ambulation trial, gait belt utilized. PT provided physical assist to navigate RW as pt tends to deviate to the R. Pt also with a downward gaze, PT provided multiple cues for forward gaze and obstacle navigation. Pt with inconsistent foot placement B and poor RW management        Balance:  Static Sit:   Stand-By Assist at EOB   Normal: Patient able to maintain steady balance without handhold support.  Static Stand:   Contact-Guard Assist with Rolling walker  Good: Patient able to maintain balance without handhold support, limited postural sway  Dynamic  Stand:  Min Assist with Rolling walker    Therapeutic Activities/Exercises     Patient assisted with functional mobility as noted above  Discussed continued plan for rehab with pt and spouse, all questions answered  Patient educated on the importance of early mobility to prevent functional decline during hospital stay  Patient was instructed to utilize staff assistance for mobility/transfers.  Patient is appropriate to transfer with min(A) and RN/PCT assist  Patient educated on PT POC and role of PT in acute care  White board updated regarding patient's safest level of mobility with staff assistance, RN also updated.     AM-PAC 6 CLICK MOBILITY  Turning over in bed (including adjusting bedclothes, sheets and blankets)?: 4  Sitting down on and standing up from a chair with arms (e.g., wheelchair, bedside commode, etc.): 3  Moving from lying on back to sitting on the side of the bed?: 3  Moving to and from a bed to a chair (including a wheelchair)?: 3  Need to walk in hospital room?: 3  Climbing 3-5 steps with a railing?: 1  Basic Mobility Total Score: 17     Patient left HOB elevated with all lines intact, call button in reach, bed alarm on, RN notified, and spouse present.        History/Goals:     PAST MEDICAL HISTORY:  Past Medical History:   Diagnosis Date    Migraine headache        Past Surgical History:   Procedure Laterality Date     SECTION  2006    MANDIBLE FRACTURE SURGERY         GOALS:   Multidisciplinary Problems       Physical Therapy Goals          Problem: Physical Therapy    Goal Priority Disciplines Outcome Goal Variances Interventions   Physical Therapy Goal     PT, PT/OT Ongoing, Progressing     Description: Goals to met by 2023    1. Sit to stand transfer with [place]  2. Bed to chair transfer with [place] using LRAD  3. Gait  x 150 feet with Supervision using LRAD   4. Ascend/descend 15 stair(s) with bilateral Handrails Stand-by Assistance using LRAD.   5. Stand for 10  minutes with Stand-by Assistance using LRAD  6. Lower extremity exercise program x15 reps per Instruction, with assistance as needed in order to facilitate improved postural control and improvement in functional independence                         Time Tracking:     PT Received On: 12/28/22  PT Start Time: 0908     PT Stop Time: 0933  PT Total Time (min): 25 min     Billable Minutes: Gait Training 25      Yaz Corral, PT  12/28/2022  Pager# [phone]

## 2022-12-28 NOTE — PT/OT/SLP PROGRESS
"Occupational Therapy   Treatment    Name: Madeline Carlton Ma  MRN: [id number]  Admitting Diagnosis:  Class 2 obesity due to excess calories with body mass index (BMI) of 37.0 to 37.9 in adult       Recommendations:     Discharge Recommendations: rehabilitation facility  Discharge Equipment Recommendations:  shower chair  Barriers to discharge:  None    Assessment:     Madeline Carlton Ma is a 37 y.o. female with a medical diagnosis of Class 2 obesity due to excess calories with body mass index (BMI) of 37.0 to 37.9 in adult.  She presents with performance deficits affecting function are weakness, impaired endurance, impaired self care skills, impaired functional mobility, gait instability, impaired balance, decreased lower extremity function, decreased upper extremity function, decreased coordination, decreased ROM, impaired coordination, impaired fine motor.     Rehab Prognosis:  Good; patient would benefit from acute skilled OT services to address these deficits and reach maximum level of function.       Plan:     Patient to be seen 4 x/week to address the above listed problems via self-care/home management, therapeutic activities, therapeutic exercises, neuromuscular re-education, cognitive retraining  Plan of Care Expires: 01/23/23  Plan of Care Reviewed with: patient    Subjective   Patient:  "I know I need to slow down.  I hope to get to rehab soon."  Pain/Comfort:  Pain Rating 1: 0/10  Pain Rating Post-Intervention 1: 0/10    Objective:     Communicated with: Nurse prior to session.  Patient found supine with telemetry, peripheral IV upon OT entry to room.    General Precautions: Standard, aspiration, fall    Orthopedic Precautions:N/A  Braces: N/A  Respiratory Status: Room air     Occupational Performance:     Bed Mobility:    Patient completed Rolling/Turning to Left with  modified independence  Patient completed Rolling/Turning to Right with modified independence  Patient completed Supine to Sit with " supervision  Patient completed Sit to Supine with supervision     Functional Mobility/Transfers:  Patient completed Sit <> Stand Transfer with contact guard assistance  with  no assistive device   Patient completed Chair <> Mat Stand Pivot technique with minimum assistance with no assistive device    Activities of Daily Living:  Grooming: minimum assistance while standing  Upper Body Dressing: minimum assistance    Lower Body Dressing: minimum assistance      AMPAC 6 Click ADL: 18    Treatment & Education:  Patient alert and oriented x 3; able to follow 4/4 one step commands.  Patient attentive and interactive throughout the session. Addressed coordination tasks while seated and standing; min assist required.    Patient left supine with all lines intact, call button in reach, and bed alarm on    GOALS:   Multidisciplinary Problems       Occupational Therapy Goals          Problem: Occupational Therapy    Goal Priority Disciplines Outcome Interventions   Occupational Therapy Goal     OT, PT/OT Ongoing, Progressing    Description: Goals set 12/25 to be addressed for 14 days with expiration date, 1/8:  Patient will increase functional independence with ADLs by performing:    Patient will demonstrate supine -sit with modified independence.   Not met  Patient will demonstrate stand pivot transfers with modified independence.   Not met  Patient will demonstrate grooming while standing with modified independence.   Not met  Patient will demonstrate upper body dressing with modified independence while seated EOB.   Not met  Patient will demonstrate lower body dressing with modified independence while seated EOB.   Not met  Patient will demonstrate toileting with modified independence.   Not met  Patient will demonstrate bathing while seated EOB with modified independence.   Not met  Patient's family / caregiver will demonstrate independence and safety with assisting patient with self-care skills and functional mobility.      Not met  Patient and/or patient's family will verbalize understanding of stroke prevention guidelines, personal risk factors and stroke warning signs via teachback method.  Not met                                  Time Tracking:     OT Date of Treatment: 12/28/22  OT Start Time: 0650  OT Stop Time: 0713  OT Total Time (min): 23 min    Billable Minutes:Self Care/Home Management 13  Neuromuscular Re-education 10    OT/DEBBIE: OT          12/28/2022

## 2022-12-28 NOTE — PROGRESS NOTES
[facility] - Neurosurgery ([facility])  Vascular Neurology  Comprehensive Stroke Center  Progress Note    Assessment/Plan:     * Class 2 obesity due to excess calories with body mass index (BMI) of 37.0 to 37.9 in adult  Stroke risk factor   on diet and exercise    New infarction of cerebellum  38 y/o female with 3 week history of headache and cracked her neck tonight and headache got worse plus she started with dysarthri, had Ptosis nina. Seen in [facility] ED and no thrombolytics due to concern for discestion so transferred to [facility] ED fopr further evaluation and CTA head and neck reveals Abnormal segment of focal caliber change with luminal narrowing and irregularity involving the V2 segment of the left vertebral artery.  Additionally, there is focal luminal narrowing and irregularity involving the V3 segment of the right vertebral artery. MRI Brain reveals Several apparent small subtle foci of restricted diffusion within the bilateral cerebellar hemispheres as well as additional punctate focus within the paramedian posterior right occipital lobe suspicious for punctate acute infarcts.  There is subtle asymmetric diffusion hyperintensity within the left lara lynn and additional region of acute ischemia not excluded.     Pending placement in IPR.     Antithrombotics: ASA 81 mg daily    Statins: Lipitor 40 mg daily    Aggressive risk factor modification: Diet, Exercise, Obesity, cracking her neck almost daily     Rehab efforts: The patient has been evaluated by a stroke team provider and the therapy needs have been fully considered based off the presenting complaints and exam findings. The following therapy evaluations are needed: PT evaluate and treat, OT evaluate and treat, SLP evaluate and treat    Diagnostics ordered/pending: Other: MBBS    VTE prophylaxis: Enoxaparin 40 mg SQ every 24 hours  Mechanical prophylaxis: Place SCDs    BP parameters: Infarct: No intervention, SBP  <180      Vertebral artery dissection  See below stroke    Ptosis, bilateral  Due to stroke  Resolving partially         12/26/2022- VSS. Neuroexam stable. NPO for concern of aspiration  12/27/2022- VSS, Neuroexam stable. MBBS today. Diet advanced to regular diet and nectar thick.  12/28/2022 - VSS, Neuroexam stable. IPR pending. C-collar being used.        STROKE DOCUMENTATION   Acute Stroke Times   Last Known Normal Date: 12/24/22  Last Known Normal Time: 2200  Symptom Onset Date: 12/24/20  Symptom Onset Time: 2200  Stroke Team Called Date: 12/25/22  Stroke Team Called Time: 0124  Stroke Team Arrival Date: 12/25/20  Stroke Team Arrival Time: 0130  CT Interpretation Time: 0000  Thrombolytic Therapy Recommended: No  CTA Interpretation Time: 0152  Thrombectomy Recommended: No    NIH Scale:  1a. Level of Consciousness: 0-->Alert, keenly responsive  1b. LOC Questions: 0-->Answers both questions correctly  1c. LOC Commands: 0-->Performs both tasks correctly  2. Best Gaze: 1-->Partial gaze palsy, gaze is abnormal in one or both eyes, but forced deviation or total gaze paresis is not present  3. Visual: 1-->Partial hemianopia  4. Facial Palsy: 0-->Normal symmetrical movements  5a. Motor Arm, Left: 0-->No drift, limb holds 90 (or 45) degrees for full 10 secs  5b. Motor Arm, Right: 0-->No drift, limb holds 90 (or 45) degrees for full 10 secs  6a. Motor Leg, Left: 0-->No drift, leg holds 30 degree position for full 5 secs  6b. Motor Leg, Right: 0-->No drift, leg holds 30 degree position for full 5 secs  7. Limb Ataxia: 0-->Absent  8. Sensory: 1-->Mild-to-moderate sensory loss, patient feels pinprick is less sharp or is dull on the affected side, or there is a loss of superficial pain with pinprick, but patient is aware of being touched  9. Best Language: 0-->No aphasia, normal  10. Dysarthria: 1-->Mild-to-moderate dysarthria, patient slurs at least some words and, at worst, can be understood with some difficulty  11.  Extinction and Inattention (formerly Neglect): 0-->No abnormality  Total (NIH Stroke Scale): 4       Modified Benton Score: 0  Lucas Coma Scale:15   ABCD2 Score:    [id number] Score:   HAS -BLED Score:   ICH Score:   Hunt & Cali Classification:      Hemorrhagic change of an Ischemic Stroke: Does this patient have an ischemic stroke with hemorrhagic changes? No     Neurologic Chief Complaint: Bilateral Vertebral Artery dissections    Subjective:     HPI, Past Medical, Family, and Social History remains the same as documented in the initial encounter.     Review of Systems   Constitutional:  Negative for chills and fever.   HENT:  Negative for drooling and ear discharge.    Eyes:  Positive for visual disturbance. Negative for pain.   Respiratory:  Negative for cough and shortness of breath.    Cardiovascular:  Negative for chest pain and leg swelling.   Gastrointestinal:  Negative for abdominal distention, abdominal pain, diarrhea, nausea and vomiting.   Endocrine: Negative for polydipsia and polyphagia.   Genitourinary:  Negative for dysuria and enuresis.   Musculoskeletal:  Negative for arthralgias and back pain.   Skin:  Negative for rash and wound.   Allergic/Immunologic: Negative for food allergies and immunocompromised state.   Neurological:  Positive for speech difficulty, numbness and headaches.   Hematological:  Does not bruise/bleed easily.   Psychiatric/Behavioral:  Negative for agitation and confusion.    Scheduled Meds:   aspirin  81 mg Oral Daily    atorvastatin  40 mg Oral Daily    enoxaparin  40 mg Subcutaneous Daily    magnesium sulfate IVPB  2 g Intravenous Once    mupirocin   Nasal BID     Continuous Infusions:   sodium chloride 0.9% 75 mL/hr at 12/26/22 1009    sodium chloride 0.9%       PRN Meds:acetaminophen, ALPRAZolam, labetaloL, methocarbamoL, ondansetron, sodium chloride 0.9%, sodium chloride 0.9%    Objective:     Vital Signs (Most Recent):  Temp: 97.2 °F (36.2 °C) (12/28/22  1557)  Pulse: 69 (12/28/22 1557)  Resp: 18 (12/28/22 1557)  BP: 133/63 (12/28/22 1557)  SpO2: 96 % (12/28/22 1557)  BP Location: Left arm    Vital Signs Range (Last 24H):  Temp:  [97.2 °F (36.2 °C)-98.8 °F (37.1 °C)]   Pulse:  []   Resp:  [17-20]   BP: (132-149)/(63-89)   SpO2:  [95 %-99 %]   BP Location: Left arm    Physical Exam  Vitals and nursing note reviewed. Exam conducted with a chaperone present.   Constitutional:       Appearance: Normal appearance. She is obese.   HENT:      Head: Normocephalic and atraumatic.   Eyes:      Extraocular Movements:      Left eye: Abnormal extraocular motion present.      Conjunctiva/sclera: Conjunctivae normal.      Pupils: Pupils are equal, round, and reactive to light.      Comments: Improving mobility and conjugate gaze   Cardiovascular:      Rate and Rhythm: Normal rate and regular rhythm.   Pulmonary:      Effort: Pulmonary effort is normal.      Breath sounds: Normal breath sounds.   Abdominal:      General: Abdomen is flat. Bowel sounds are normal.      Palpations: Abdomen is soft.   Musculoskeletal:         General: Normal range of motion.      Cervical back: Normal range of motion.   Skin:     General: Skin is warm and dry.   Neurological:      Mental Status: She is alert.      Sensory: Sensory deficit present.      Comments: Dysarthria, ptosis     Psychiatric:         Mood and Affect: Mood normal.         Behavior: Behavior normal.       Neurological Exam:   LOC: alert  Attention Span: Good   Language: No aphasia  Articulation: Dysarthria  Orientation: Person, Place, Time   Visual Fields: Hemianopsia left  EOM (CN III, IV, VI): Ptosis bilaterally, disconjugate gaze, L. Eye ophthalmoplegia   Pupils (CN II, III): PERRL  Facial Sensation (CN V): Normal  Facial Movement (CN VII): Symmetric facial expression    Gag Reflex: present  Reflexes: flexor plantar responses bilaterally  Motor: Arm left  Normal 5/5  Leg left  Normal 5/5  Arm right  Normal 5/5  Leg right  Normal 5/5  Cerebellum: Upper Extremity Appendicular Ataxia (Finger Nose Finger)  Left only when both eyes open  Sensation: Govind-hypoesthesia left  Tone: Normal tone throughout       Laboratory:  CMP:   No results for input(s): GLUCOSE, CALCIUM, ALBUMIN, PROT, NA, K, CO2, CL, BUN, CREATININE, ALKPHOS, ALT, AST, BILITOT in the last 24 hours.    BMP:   Recent Labs   Lab 12/27/22  0536      K 4.2   *   CO2 18*   BUN 11   CREATININE 0.7   CALCIUM 8.2*       CBC:   Recent Labs   Lab 12/27/22  0535   WBC 6.94   RBC 4.52   HGB 13.1   HCT 41.5      MCV 92   MCH 29.0   MCHC 31.6*       Lipid Panel:   Recent Labs   Lab 12/25/22  0346   CHOL 179   LDLCALC 109.4   HDL 56   TRIG 68       Coagulation:   Recent Labs   Lab 12/25/22  0346   INR 1.0   APTT 25.0       Hgb A1C:   Recent Labs   Lab 12/25/22  0346   HGBA1C 4.8       TSH:   Recent Labs   Lab 12/25/22  0346   TSH 1.318         Diagnostic Results     Brain imaging:  CT Head w/o contrast 12-24-22 results:  No acute intracranial process identified     MRI Brain w/o contrast 12-25-22 results:    1.  Several apparent small subtle foci of restricted diffusion within the bilateral cerebellar hemispheres as well as additional punctate focus within the paramedian posterior right occipital lobe suspicious for punctate acute infarcts.  There is subtle asymmetric diffusion hyperintensity within the left govind lynn and additional region of acute ischemia not excluded.  Further evaluation and follow-up as warranted.     2.  Apparent small region of encephalomalacia within the left cerebellar hemisphere which may represent a remote infarct.  Additional possible punctate lacunar type infarct within the right superior cerebellar hemisphere.     3.  Abnormal T2 flow void involving the V3 segment of the right vertebral artery in this patient with suspected vertebral artery dissection.     4.  Several scattered foci of periventricular T2/FLAIR hyperintensity.  These lesions  are nonspecific in appearance and may be seen with accelerated small vessel ischemic disease, vasculopathies, migraine headaches, and as the residua from inflammatory and traumatic insults to the brain.     Preliminary findings were discussed with Ede GAINES by myself at 03:31 on 12/25/2022.     Vessel Imaging:  CTA Head and neck 12-25-22 results:  1.  Abnormal segment of focal caliber change with luminal narrowing and irregularity involving the V2 segment of the left vertebral artery.  Additionally, there is focal luminal narrowing and irregularity involving the V3 segment of the right vertebral artery.  Findings are concerning for possible bilateral vertebral artery dissection.  Vascular neurology consultation advised.  Further assessment could be performed with conventional angiography as warranted.     2.  Persistent asymmetric wedge-shaped region of parenchymal hypoattenuation within the left cerebellar hemisphere.  There is apparent contrast enhancement on the delayed series.  This is nonspecific although may relate to subacute infarct.  More sensitive assessment for ischemic could be performed with MRI if there are no patient contraindications.     3.  No evidence of cervical spine fracture.  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     Cardiac Evaluation:   EKG 12-24-22 results:  Normal sinus rhythm  Normal ECG  No previous ECGs available    TTE 12/25/22   The left ventricle is normal in size with normal systolic function.   The estimated ejection fraction is 65%.   Normal left ventricular diastolic function.   Normal right ventricular size with normal right ventricular systolic function.   Normal central venous pressure (3 mmHg).   The estimated PA systolic pressure is 27 mmHg.            Sundar Norwood MD  Comprehensive Stroke Center  Department of Vascular Neurology   [facility])

## 2022-12-28 NOTE — SUBJECTIVE & OBJECTIVE
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     Review of Systems   Constitutional:  Negative for chills and fever.   HENT:  Negative for drooling and ear discharge.    Eyes:  Positive for visual disturbance. Negative for pain.   Respiratory:  Negative for cough and shortness of breath.    Cardiovascular:  Negative for chest pain and leg swelling.   Gastrointestinal:  Negative for abdominal distention, abdominal pain, diarrhea, nausea and vomiting.   Endocrine: Negative for polydipsia and polyphagia.   Genitourinary:  Negative for dysuria and enuresis.   Musculoskeletal:  Negative for arthralgias and back pain.   Skin:  Negative for rash and wound.   Allergic/Immunologic: Negative for food allergies and immunocompromised state.   Neurological:  Positive for speech difficulty, numbness and headaches.   Hematological:  Does not bruise/bleed easily.   Psychiatric/Behavioral:  Negative for agitation and confusion.    Scheduled Meds:   aspirin  81 mg Oral Daily    atorvastatin  40 mg Oral Daily    enoxaparin  40 mg Subcutaneous Daily    magnesium sulfate IVPB  2 g Intravenous Once    mupirocin   Nasal BID     Continuous Infusions:   sodium chloride 0.9% 75 mL/hr at 12/26/22 1009    sodium chloride 0.9%       PRN Meds:acetaminophen, ALPRAZolam, labetaloL, methocarbamoL, ondansetron, sodium chloride 0.9%, sodium chloride 0.9%    Objective:     Vital Signs (Most Recent):  Temp: 97.2 °F (36.2 °C) (12/28/22 1557)  Pulse: 69 (12/28/22 1557)  Resp: 18 (12/28/22 1557)  BP: 133/63 (12/28/22 1557)  SpO2: 96 % (12/28/22 1557)  BP Location: Left arm    Vital Signs Range (Last 24H):  Temp:  [97.2 °F (36.2 °C)-98.8 °F (37.1 °C)]   Pulse:  []   Resp:  [17-20]   BP: (132-149)/(63-89)   SpO2:  [95 %-99 %]   BP Location: Left arm    Physical Exam  Vitals and nursing note reviewed. Exam  conducted with a chaperone present.   Constitutional:       Appearance: Normal appearance. She is obese.   HENT:      Head: Normocephalic and atraumatic.   Eyes:      Extraocular Movements:      Left eye: Abnormal extraocular motion present.      Conjunctiva/sclera: Conjunctivae normal.      Pupils: Pupils are equal, round, and reactive to light.      Comments: Improving mobility and conjugate gaze   Cardiovascular:      Rate and Rhythm: Normal rate and regular rhythm.   Pulmonary:      Effort: Pulmonary effort is normal.      Breath sounds: Normal breath sounds.   Abdominal:      General: Abdomen is flat. Bowel sounds are normal.      Palpations: Abdomen is soft.   Musculoskeletal:         General: Normal range of motion.      Cervical back: Normal range of motion.   Skin:     General: Skin is warm and dry.   Neurological:      Mental Status: She is alert.      Sensory: Sensory deficit present.      Comments: Dysarthria, ptosis     Psychiatric:         Mood and Affect: Mood normal.         Behavior: Behavior normal.       Neurological Exam:   LOC: alert  Attention Span: Good   Language: No aphasia  Articulation: Dysarthria  Orientation: Person, Place, Time   Visual Fields: Hemianopsia left  EOM (CN III, IV, VI): Ptosis bilaterally, disconjugate gaze, L. Eye ophthalmoplegia   Pupils (CN II, III): PERRL  Facial Sensation (CN V): Normal  Facial Movement (CN VII): Symmetric facial expression    Gag Reflex: present  Reflexes: flexor plantar responses bilaterally  Motor: Arm left  Normal 5/5  Leg left  Normal 5/5  Arm right  Normal 5/5  Leg right Normal 5/5  Cerebellum: Upper Extremity Appendicular Ataxia (Finger Nose Finger)  Left only when both eyes open  Sensation: Govind-hypoesthesia left  Tone: Normal tone throughout       Laboratory:  CMP:   No results for input(s): GLUCOSE, CALCIUM, ALBUMIN, PROT, NA, K, CO2, CL, BUN, CREATININE, ALKPHOS, ALT, AST, BILITOT in the last 24 hours.    BMP:   Recent Labs   Lab  12/27/22  0536      K 4.2   *   CO2 18*   BUN 11   CREATININE 0.7   CALCIUM 8.2*       CBC:   Recent Labs   Lab 12/27/22  0535   WBC 6.94   RBC 4.52   HGB 13.1   HCT 41.5      MCV 92   MCH 29.0   MCHC 31.6*       Lipid Panel:   Recent Labs   Lab 12/25/22  0346   CHOL 179   LDLCALC 109.4   HDL 56   TRIG 68       Coagulation:   Recent Labs   Lab 12/25/22  0346   INR 1.0   APTT 25.0       Hgb A1C:   Recent Labs   Lab 12/25/22  0346   HGBA1C 4.8       TSH:   Recent Labs   Lab 12/25/22  0346   TSH 1.318         Diagnostic Results     Brain imaging:  CT Head w/o contrast 12-24-22 results:  No acute intracranial process identified     MRI Brain w/o contrast 12-25-22 results:    1.  Several apparent small subtle foci of restricted diffusion within the bilateral cerebellar hemispheres as well as additional punctate focus within the paramedian posterior right occipital lobe suspicious for punctate acute infarcts.  There is subtle asymmetric diffusion hyperintensity within the left lara lynn and additional region of acute ischemia not excluded.  Further evaluation and follow-up as warranted.     2.  Apparent small region of encephalomalacia within the left cerebellar hemisphere which may represent a remote infarct.  Additional possible punctate lacunar type infarct within the right superior cerebellar hemisphere.     3.  Abnormal T2 flow void involving the V3 segment of the right vertebral artery in this patient with suspected vertebral artery dissection.     4.  Several scattered foci of periventricular T2/FLAIR hyperintensity.  These lesions are nonspecific in appearance and may be seen with accelerated small vessel ischemic disease, vasculopathies, migraine headaches, and as the residua from inflammatory and traumatic insults to the brain.     Preliminary findings were discussed with Ede GAINES by myself at 03:31 on 12/25/2022.     Vessel Imaging:  CTA Head and neck 12-25-22 results:  1.  Abnormal segment  of focal caliber change with luminal narrowing and irregularity involving the V2 segment of the left vertebral artery.  Additionally, there is focal luminal narrowing and irregularity involving the V3 segment of the right vertebral artery.  Findings are concerning for possible bilateral vertebral artery dissection.  Vascular neurology consultation advised.  Further assessment could be performed with conventional angiography as warranted.     2.  Persistent asymmetric wedge-shaped region of parenchymal hypoattenuation within the left cerebellar hemisphere.  There is apparent contrast enhancement on the delayed series.  This is nonspecific although may relate to subacute infarct.  More sensitive assessment for ischemic could be performed with MRI if there are no patient contraindications.     3.  No evidence of cervical spine fracture.  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     Cardiac Evaluation:   EKG 12-24-22 results:  Normal sinus rhythm  Normal ECG  No previous ECGs available    TTE 12/25/22  The left ventricle is normal in size with normal systolic function.  The estimated ejection fraction is 65%.  Normal left ventricular diastolic function.  Normal right ventricular size with normal right ventricular systolic function.  Normal central venous pressure (3 mmHg).  The estimated PA systolic pressure is 27 mmHg.

## 2022-12-29 PROBLEM — H53.2 DIPLOPIA: Status: ACTIVE | Noted: 2022-12-29

## 2022-12-29 PROCEDURE — 92526 ORAL FUNCTION THERAPY: CPT

## 2022-12-29 PROCEDURE — 11000001 HC ACUTE MED/SURG PRIVATE ROOM

## 2022-12-29 PROCEDURE — 97535 SELF CARE MNGMENT TRAINING: CPT

## 2022-12-29 PROCEDURE — 25000003 PHARM REV CODE 250: Performed by: NURSE PRACTITIONER

## 2022-12-29 PROCEDURE — 99233 PR SUBSEQUENT HOSPITAL CARE,LEVL III: ICD-10-PCS | Mod: ,,, | Performed by: PSYCHIATRY & NEUROLOGY

## 2022-12-29 PROCEDURE — 63600175 PHARM REV CODE 636 W HCPCS: Performed by: NURSE PRACTITIONER

## 2022-12-29 PROCEDURE — 25000003 PHARM REV CODE 250: Performed by: PSYCHIATRY & NEUROLOGY

## 2022-12-29 PROCEDURE — 99233 SBSQ HOSP IP/OBS HIGH 50: CPT | Mod: ,,, | Performed by: PSYCHIATRY & NEUROLOGY

## 2022-12-29 PROCEDURE — 97112 NEUROMUSCULAR REEDUCATION: CPT

## 2022-12-29 PROCEDURE — 92507 TX SP LANG VOICE COMM INDIV: CPT

## 2022-12-29 RX ADMIN — MUPIROCIN: 20 OINTMENT TOPICAL at 08:12

## 2022-12-29 RX ADMIN — ATORVASTATIN CALCIUM 40 MG: 20 TABLET, FILM COATED ORAL at 08:12

## 2022-12-29 RX ADMIN — ASPIRIN 81 MG: 81 TABLET, COATED ORAL at 08:12

## 2022-12-29 RX ADMIN — ENOXAPARIN SODIUM 40 MG: 40 INJECTION SUBCUTANEOUS at 04:12

## 2022-12-29 NOTE — SUBJECTIVE & OBJECTIVE
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     Review of Systems   Constitutional:  Negative for chills and fever.   HENT:  Negative for drooling and ear discharge.    Eyes:  Positive for visual disturbance. Negative for pain.   Respiratory:  Negative for cough and shortness of breath.    Cardiovascular:  Negative for chest pain and leg swelling.   Gastrointestinal:  Negative for abdominal distention, abdominal pain, constipation, diarrhea, nausea and vomiting.   Endocrine: Negative for polydipsia and polyphagia.   Genitourinary:  Negative for dysuria and enuresis.   Musculoskeletal:  Negative for arthralgias and back pain.   Skin:  Negative for rash and wound.   Allergic/Immunologic: Negative for food allergies and immunocompromised state.   Neurological:  Positive for speech difficulty, numbness and headaches.   Hematological:  Does not bruise/bleed easily.   Psychiatric/Behavioral:  Positive for dysphoric mood. Negative for agitation, confusion and sleep disturbance. The patient is nervous/anxious.    Scheduled Meds:   aspirin  81 mg Oral Daily    atorvastatin  40 mg Oral Daily    enoxaparin  40 mg Subcutaneous Daily    mupirocin   Nasal BID     Continuous Infusions:   sodium chloride 0.9%       PRN Meds:acetaminophen, ALPRAZolam, labetaloL, methocarbamoL, ondansetron, sodium chloride 0.9%, sodium chloride 0.9%    Objective:     Vital Signs (Most Recent):  Temp: 98.1 °F (36.7 °C) (12/29/22 0733)  Pulse: 85 (12/29/22 0733)  Resp: 18 (12/29/22 0733)  BP: 131/88 (12/29/22 0733)  SpO2: 96 % (12/29/22 0733)  BP Location: Right arm    Vital Signs Range (Last 24H):  Temp:  [97.2 °F (36.2 °C)-98.8 °F (37.1 °C)]   Pulse:  [63-92]   Resp:  [16-18]   BP: (124-143)/(63-88)   SpO2:  [94 %-97 %]   BP Location: Right arm    Physical Exam  Vitals and nursing note reviewed. Exam  conducted with a chaperone present.   Constitutional:       Appearance: Normal appearance. She is obese.   HENT:      Head: Normocephalic and atraumatic.   Eyes:      Extraocular Movements:      Left eye: Abnormal extraocular motion present.      Conjunctiva/sclera: Conjunctivae normal.      Pupils: Pupils are equal, round, and reactive to light.      Comments: Improving mobility and conjugate gaze   Cardiovascular:      Rate and Rhythm: Normal rate and regular rhythm.   Pulmonary:      Effort: Pulmonary effort is normal.      Breath sounds: Normal breath sounds.   Abdominal:      General: Abdomen is flat. Bowel sounds are normal.      Palpations: Abdomen is soft.   Musculoskeletal:         General: Normal range of motion.      Cervical back: Normal range of motion.   Skin:     General: Skin is warm and dry.   Neurological:      Mental Status: She is alert and oriented to person, place, and time.      Cranial Nerves: Cranial nerve deficit present.      Sensory: Sensory deficit present.      Coordination: Coordination abnormal.      Comments: Dysarthria, ptosis     Psychiatric:         Mood and Affect: Mood is depressed. Affect is labile and tearful.         Speech: Speech is slurred.         Behavior: Behavior normal.       Neurological Exam:   LOC: alert  Attention Span: Good   Language: No aphasia  Articulation: Dysarthria  Orientation: Person, Place, Time   Visual Fields: Hemianopsia left  EOM (CN III, IV, VI): Ptosis bilaterally, disconjugate gaze, L. Eye ophthalmoplegia   Pupils (CN II, III): PERRL  Facial Sensation (CN V): Normal  Facial Movement (CN VII): Symmetric facial expression    Gag Reflex: present  Reflexes: flexor plantar responses bilaterally  Motor: Arm left  Normal 5/5  Leg left  Normal 5/5  Arm right  Normal 5/5  Leg right Normal 5/5  Cerebellum: Upper Extremity Appendicular Ataxia (Finger Nose Finger)  Left only when both eyes open  Sensation: Govind-hypoesthesia left  Tone: Normal tone  throughout       Laboratory:  CMP:   No results for input(s): GLUCOSE, CALCIUM, ALBUMIN, PROT, NA, K, CO2, CL, BUN, CREATININE, ALKPHOS, ALT, AST, BILITOT in the last 24 hours.    BMP:   Recent Labs   Lab 12/27/22  0536      K 4.2   *   CO2 18*   BUN 11   CREATININE 0.7   CALCIUM 8.2*       CBC:   Recent Labs   Lab 12/27/22  0535   WBC 6.94   RBC 4.52   HGB 13.1   HCT 41.5      MCV 92   MCH 29.0   MCHC 31.6*       Lipid Panel:   Recent Labs   Lab 12/25/22  0346   CHOL 179   LDLCALC 109.4   HDL 56   TRIG 68       Coagulation:   Recent Labs   Lab 12/25/22  0346   INR 1.0   APTT 25.0       Hgb A1C:   Recent Labs   Lab 12/25/22  0346   HGBA1C 4.8       TSH:   Recent Labs   Lab 12/25/22  0346   TSH 1.318         Diagnostic Results     Brain imaging:  CT Head w/o contrast 12-24-22 results:  No acute intracranial process identified     MRI Brain w/o contrast 12-25-22 results:    1.  Several apparent small subtle foci of restricted diffusion within the bilateral cerebellar hemispheres as well as additional punctate focus within the paramedian posterior right occipital lobe suspicious for punctate acute infarcts.  There is subtle asymmetric diffusion hyperintensity within the left lara lynn and additional region of acute ischemia not excluded.  Further evaluation and follow-up as warranted.     2.  Apparent small region of encephalomalacia within the left cerebellar hemisphere which may represent a remote infarct.  Additional possible punctate lacunar type infarct within the right superior cerebellar hemisphere.     3.  Abnormal T2 flow void involving the V3 segment of the right vertebral artery in this patient with suspected vertebral artery dissection.     4.  Several scattered foci of periventricular T2/FLAIR hyperintensity.  These lesions are nonspecific in appearance and may be seen with accelerated small vessel ischemic disease, vasculopathies, migraine headaches, and as the residua from inflammatory  and traumatic insults to the brain.     Preliminary findings were discussed with Ede GAINES by myself at 03:31 on 12/25/2022.     Vessel Imaging:  CTA Head and neck 12-25-22 results:  1.  Abnormal segment of focal caliber change with luminal narrowing and irregularity involving the V2 segment of the left vertebral artery.  Additionally, there is focal luminal narrowing and irregularity involving the V3 segment of the right vertebral artery.  Findings are concerning for possible bilateral vertebral artery dissection.  Vascular neurology consultation advised.  Further assessment could be performed with conventional angiography as warranted.     2.  Persistent asymmetric wedge-shaped region of parenchymal hypoattenuation within the left cerebellar hemisphere.  There is apparent contrast enhancement on the delayed series.  This is nonspecific although may relate to subacute infarct.  More sensitive assessment for ischemic could be performed with MRI if there are no patient contraindications.     3.  No evidence of cervical spine fracture.  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     Cardiac Evaluation:   EKG 12-24-22 results:  Normal sinus rhythm  Normal ECG  No previous ECGs available    TTE 12/25/22  The left ventricle is normal in size with normal systolic function.  The estimated ejection fraction is 65%.  Normal left ventricular diastolic function.  Normal right ventricular size with normal right ventricular systolic function.  Normal central venous pressure (3 mmHg).  The estimated PA systolic pressure is 27 mmHg.

## 2022-12-29 NOTE — ASSESSMENT & PLAN NOTE
Binocular horizontal diplopia  Persistent since stroke, mild improvement. Will be going to rehab but L. ophthalmoplegia   Outpatient referral for Neuro-ophthalmology   Please see New infarction of cerebellum

## 2022-12-29 NOTE — PLAN OF CARE
Problem: Communication Impairment (Stroke, Ischemic/Transient Ischemic Attack)  Goal: Improved Communication Skills  Outcome: Ongoing, Progressing  Intervention: Optimize Communication Skills  Flowsheets (Taken 12/29/2022 0337)  Communication Enhancement Strategies:   call light answered in person   extra time allowed for response   family/caregiver assisted with communication   repetition utilized     Problem: Fall Injury Risk  Goal: Absence of Fall and Fall-Related Injury  Outcome: Ongoing, Progressing  Intervention: Promote Injury-Free Environment  Flowsheets (Taken 12/29/2022 0337)  Safety Promotion/Fall Prevention:   assistive device/personal item within reach   bed alarm set   Fall Risk reviewed with patient/family   medications reviewed   nonskid shoes/socks when out of bed   side rails raised x 2   instructed to call staff for mobility     Problem: Adult Inpatient Plan of Care  Goal: Plan of Care Review  Outcome: Ongoing, Progressing     Problem: Skin Injury Risk Increased  Goal: Skin Health and Integrity  Outcome: Ongoing, Progressing  Intervention: Optimize Skin Protection  Flowsheets (Taken 12/29/2022 0337)  Pressure Reduction Techniques: frequent weight shift encouraged  Pressure Reduction Devices: positioning supports utilized  Skin Protection:   adhesive use limited   incontinence pads utilized   tubing/devices free from skin contact  Head of Bed (HOB) Positioning: HOB elevated     POC reviewed with patient and spouse who remained at bedside this shift.  A/O x4.  Respirations unlabored.  Skin w/d.  Continent of b/b.  Up to bedside commode with x1 mod assist.  Pleasant and cooperative.  VSS.  See flowsheet for full assessment.  Able to verbalize wants/needs.  No c/o pain or discomfort at this time.  Fall/safety precautions maintained.

## 2022-12-29 NOTE — PT/OT/SLP PROGRESS
"Occupational Therapy   Treatment    Name: Madeline Carlton Ma  MRN: [id number]  Admitting Diagnosis:  Class 2 obesity due to excess calories with body mass index (BMI) of 37.0 to 37.9 in adult       Recommendations:     Discharge Recommendations: rehabilitation facility  Discharge Equipment Recommendations:  bath bench, bedside commode  Barriers to discharge:  None    Assessment:     Madeline Carlton Ma is a 37 y.o. female with a medical diagnosis of Class 2 obesity due to excess calories with body mass index (BMI) of 37.0 to 37.9 in adult.  She presents with performance deficits affecting function are weakness, impaired endurance, impaired self care skills, impaired functional mobility, impaired balance, decreased lower extremity function, decreased upper extremity function, decreased coordination, impaired coordination, impaired fine motor.     Rehab Prognosis:  Good; patient would benefit from acute skilled OT services to address these deficits and reach maximum level of function.       Plan:     Patient to be seen 4 x/week to address the above listed problems via self-care/home management, therapeutic activities, therapeutic exercises, neuromuscular re-education  Plan of Care Expires: 01/23/23  Plan of Care Reviewed with: patient, spouse    Subjective   Patient:  "I hope to get to rehab today."  :  "She is getting better with feeding herself."   Pain/Comfort:  Pain Rating 1: 0/10  Pain Rating Post-Intervention 1: 0/10    Objective:     Communicated with: Nurse prior to session.  Patient found supine with telemetry, peripheral IV upon OT entry to room.    General Precautions: Standard, aspiration, fall, nectar thick    Orthopedic Precautions:N/A  Braces: Cervical collar  Respiratory Status: Room air     Occupational Performance:     Bed Mobility:    Patient completed Rolling/Turning to Left with  modified independence  Patient completed Rolling/Turning to Right with modified independence  Patient completed " Supine to Sit with supervision  Patient completed Sit to Supine with supervision     Functional Mobility/Transfers:  Patient completed Sit <> Stand Transfer with contact guard assistance  with  no assistive device   Patient completed Toilet Transfer Stand Pivot technique with minimum assistance with  no AD    Activities of Daily Living:  Grooming: minimum assistance while standing  Upper Body Dressing: minimum assistance    Lower Body Dressing: minimum assistance    Toileting: minimum assistance      AMPAC 6 Click ADL: 18    Treatment & Education:  Patient alert and oriented x 3; able to follow 4/4 one step commands.  Patient attentive and interactive throughout the session.      Patient left supine with all lines intact, call button in reach, and bed alarm on    GOALS:   Multidisciplinary Problems       Occupational Therapy Goals          Problem: Occupational Therapy    Goal Priority Disciplines Outcome Interventions   Occupational Therapy Goal     OT, PT/OT Ongoing, Progressing    Description: Goals set 12/25 to be addressed for 14 days with expiration date, 1/8:  Patient will increase functional independence with ADLs by performing:    Patient will demonstrate supine -sit with modified independence.   Not met  Patient will demonstrate stand pivot transfers with modified independence.   Not met  Patient will demonstrate grooming while standing with modified independence.   Not met  Patient will demonstrate upper body dressing with modified independence while seated EOB.   Not met  Patient will demonstrate lower body dressing with modified independence while seated EOB.   Not met  Patient will demonstrate toileting with modified independence.   Not met  Patient will demonstrate bathing while seated EOB with modified independence.   Not met  Patient's family / caregiver will demonstrate independence and safety with assisting patient with self-care skills and functional mobility.     Not met  Patient and/or  patient's family will verbalize understanding of stroke prevention guidelines, personal risk factors and stroke warning signs via teachback method.  Not met                                  Time Tracking:     OT Date of Treatment: 12/29/22  OT Start Time: 0625  OT Stop Time: 0648  OT Total Time (min): 23 min    Billable Minutes:Self Care/Home Management 13  Neuromuscular Re-education 10    OT/DEBBIE: OT          12/29/2022

## 2022-12-29 NOTE — PT/OT/SLP PROGRESS
"Speech Language Pathology Treatment    Patient Name:  Madeline Carlton Ma   MRN:  [id number]  Admitting Diagnosis: Class 2 obesity due to excess calories with body mass index (BMI) of 37.0 to 37.9 in adult    Recommendations:                 General Recommendations:  Dysphagia therapy and Speech/language therapy  Diet recommendations:  Regular, Liquid Diet Level: Nectar Thick   Aspiration Precautions: 1 bite/sip at a time, Feed only when awake/alert, HOB to 90 degrees, Meds whole 1 at a time, No straws, Small bites/sips, and Strict aspiration precautions   General Precautions: Standard, aspiration, fall, nectar thick  Communication strategies:  provide increased time to answer    Subjective     " I'm sorry" pt apologizing for being upset  Patient goals: to get to rehab     Pain/Comfort:  Pain Rating 1: 0/10  Pain Rating Post-Intervention 1: 0/10    Respiratory Status: Room air    Objective:     Has the patient been evaluated by SLP for swallowing?   Yes  Keep patient NPO? No   Current Respiratory Status:        Pt seen bedside with her  present. Pt able to recall 2/3 speech strategies. Pt labile during session and needed cues to relax and take deep breaths before trying to talk again. Pt with decreased diadochokinetic rate noted. Pt given exercises to practice. Pt reported tongue was about 90% back to normal with only slight numbness still present. Pt reported hating the thickener. Reviewed swallowing precautions. Pt allowed to drink a cup of water. Pt with one overt coughing noted. Reviewed importance of taking small sips. Pt may have sips of thin water throughout the day when upright and taking small sips. Pt and spouse expressed understanding of precautions. Encouraged pt to slow rate during meals as well as spouse reported pt does eat quickly. Pt and spouse expressed understanding of information. Pt will need ongoing cues to use  speech strategies and slow rate during eating.     Assessment:     Madeline " Bianka Angeles is a 37 y.o. female with an SLP diagnosis of Dysphagia and Dysarthria.  She presents with progress towards goals.    Goals:   Multidisciplinary Problems       SLP Goals          Problem: SLP    Goal Priority Disciplines Outcome   SLP Goal     SLP Ongoing, Progressing   Description: Speech Language Pathology Goals  Goals expected to be met by 1/1/23    1.Pt will participate in ongoing assessment of swallow function to determine safest, least restrictive means of nutrition/hydration  2. Educate Pt and family on aspiration precautions and SLP POC  3. Pt will participate in further assessment of speech, language and cognition to determine ongoing POC needs MET  Additional Goals  4. Pt will utilize clear speech strategies at conversation level to improve intelligibility to 100% given supervision.                        Plan:     Patient to be seen:  4 x/week   Plan of Care expires:  01/23/23  Plan of Care reviewed with:  patient, spouse   SLP Follow-Up:  Yes       Discharge recommendations:  rehabilitation facility   Barriers to Discharge:  None    Time Tracking:     SLP Treatment Date:   10/06/22  Speech Start Time:  0948  Speech Stop Time:  1006     Speech Total Time (min):  18 min    Billable Minutes: Speech Therapy Individual 9 and Treatment Swallowing Dysfunction 9    12/29/2022

## 2022-12-29 NOTE — ASSESSMENT & PLAN NOTE
38 y/o female with 3 week history of headache and cracked her neck tonight and headache got worse plus she started with dysarthri, had Ptosis nina. Seen in [facility] ED and no thrombolytics due to concern for discestion so transferred to [facility] ED fopr further evaluation and CTA head and neck reveals Abnormal segment of focal caliber change with luminal narrowing and irregularity involving the V2 segment of the left vertebral artery.  Additionally, there is focal luminal narrowing and irregularity involving the V3 segment of the right vertebral artery. MRI Brain reveals Several apparent small subtle foci of restricted diffusion within the bilateral cerebellar hemispheres as well as additional punctate focus within the paramedian posterior right occipital lobe suspicious for punctate acute infarcts.  There is subtle asymmetric diffusion hyperintensity within the left lara lynn and additional region of acute ischemia not excluded.     Patient is pending authorization for IPR. Neuro exam stable.    Antithrombotics: ASA 81 mg daily    Statins: Lipitor 40 mg daily    Aggressive risk factor modification: Diet, Exercise, Obesity, cracking her neck almost daily     Rehab efforts: The patient has been evaluated by a stroke team provider and the therapy needs have been fully considered based off the presenting complaints and exam findings. The following therapy evaluations are needed: PT evaluate and treat, OT evaluate and treat, SLP evaluate and treat    Diagnostics ordered/pending: Other: MBBS    VTE prophylaxis: Enoxaparin 40 mg SQ every 24 hours  Mechanical prophylaxis: Place SCDs    BP parameters: Infarct: No intervention, SBP <180

## 2022-12-29 NOTE — PLAN OF CARE
12/29/22 0754   Post-Acute Status   Post-Acute Authorization Placement   Post-Acute Placement Status Pending payor review/awaiting authorization (if required)   Coverage UMR   Discharge Plan   Discharge Plan A Rehab     SW reached out to Sharron at [facility] (cell # [phone]).  She stated that auth is still pending and she will reach out to this writer with any updates.  Plan to d/c today once auth is obtained.    Deneen Chery, ANA ROSA  [facility]  [phone]

## 2022-12-30 VITALS
RESPIRATION RATE: 18 BRPM | OXYGEN SATURATION: 98 % | DIASTOLIC BLOOD PRESSURE: 89 MMHG | HEART RATE: 86 BPM | SYSTOLIC BLOOD PRESSURE: 143 MMHG | HEIGHT: 61 IN | BODY MASS INDEX: 38.34 KG/M2 | TEMPERATURE: 99 F | WEIGHT: 203.06 LBS

## 2022-12-30 PROBLEM — H02.403 PTOSIS, BILATERAL: Status: RESOLVED | Noted: 2022-12-24 | Resolved: 2022-12-30

## 2022-12-30 PROBLEM — F48.2 PSEUDOBULBAR AFFECT: Status: ACTIVE | Noted: 2022-12-30

## 2022-12-30 PROCEDURE — 97116 GAIT TRAINING THERAPY: CPT | Mod: CQ

## 2022-12-30 PROCEDURE — 92507 TX SP LANG VOICE COMM INDIV: CPT

## 2022-12-30 PROCEDURE — 99233 SBSQ HOSP IP/OBS HIGH 50: CPT | Mod: ,,, | Performed by: PSYCHIATRY & NEUROLOGY

## 2022-12-30 PROCEDURE — 92526 ORAL FUNCTION THERAPY: CPT

## 2022-12-30 PROCEDURE — 99233 PR SUBSEQUENT HOSPITAL CARE,LEVL III: ICD-10-PCS | Mod: ,,, | Performed by: PSYCHIATRY & NEUROLOGY

## 2022-12-30 PROCEDURE — 25000003 PHARM REV CODE 250: Performed by: NURSE PRACTITIONER

## 2022-12-30 RX ORDER — ATORVASTATIN CALCIUM 40 MG/1
40 TABLET, FILM COATED ORAL DAILY
Qty: 90 TABLET | Refills: 3
Start: 2022-12-30 | End: 2023-02-14 | Stop reason: SDUPTHER

## 2022-12-30 RX ORDER — ASPIRIN 81 MG/1
81 TABLET ORAL DAILY
Qty: 360 TABLET | Refills: 0
Start: 2022-12-30 | End: 2024-03-18

## 2022-12-30 RX ADMIN — ASPIRIN 81 MG: 81 TABLET, COATED ORAL at 09:12

## 2022-12-30 RX ADMIN — ATORVASTATIN CALCIUM 40 MG: 20 TABLET, FILM COATED ORAL at 09:12

## 2022-12-30 NOTE — PLAN OF CARE
12/30/22 1230   Final Note   Assessment Type Final Discharge Note   Anticipated Discharge Disposition Rehab     Auth obtained and report called.  Transport set up for 1230 to [facility].      Deneen Chery LMSW  [facility]  [phone]

## 2022-12-30 NOTE — PT/OT/SLP PROGRESS
"Speech Language Pathology Treatment    Patient Name:  Madeline Carlton Ma   MRN:  [id number]  Admitting Diagnosis: New infarction of cerebellum    Recommendations:                 General Recommendations:  Dysphagia therapy, Speech/language therapy, and Cognitive-linguistic therapy  Diet recommendations:  Regular, Liquid Diet Level: Nectar Thick   Aspiration Precautions: 1 bite/sip at a time, Avoid talking while eating, Feed only when awake/alert, HOB to 90 degrees, Ice chips sparingly, Meds whole 1 at a time, No straws, Small bites/sips, and Strict aspiration precautions   General Precautions: Standard, aspiration, fall, nectar thick  Communication strategies:  provide increased time to answer and go to room if call light pushed    Subjective     SLP reviewed Pt with RN, RN cleared for tx  Pt presents emotionally labile  She explains, "I am trying"     Pain/Comfort:  Pain Rating 1: 0/10    Respiratory Status: Room air    Objective:     Has the patient been evaluated by SLP for swallowing?   Yes  Keep patient NPO? No   Current Respiratory Status:    room air    Pt found awake and alert in bed with Spouse in room. Pt donned L eye patch and eyeglasses t/o session. Spouse asked questions re: timeline for vision recovery, questions deferred to MD. She demonstrated waxing/waning emotional lability. Pt provided encouragement and active listening.  She demonstrated mildly decreased articulatory precision with fast rate of speech t/ conversational speech tasks which required occasional cues for Pt to monitor rate of speech and breath support to clarify. She denied difficulty with meals provided single bites/sips. She politely declined sips thin liquids, gradually accepted presentations of ice chips. Pt seen with self-fed trials of ice chips x 4. No overt S/S aspiration with thin liquids accepted. She was educated on SLP role, aspiration precautions, ongoing clear speech strategies and recommendations for ongoing ST via IRF " upon d/c from acute. Pt verbalized understanding. No additional questions. Whiteboard current.      Assessment:     Madeline Carlton Ma is a 37 y.o. female with an SLP diagnosis of Dysphagia and Dysarthria.      Goals:   Multidisciplinary Problems       SLP Goals          Problem: SLP    Goal Priority Disciplines Outcome   SLP Goal     SLP Ongoing, Progressing   Description: Speech Language Pathology Goals  Goals expected to be met by 1/1/23    1.Pt will participate in ongoing assessment of swallow function to determine safest, least restrictive means of nutrition/hydration  2. Educate Pt and family on aspiration precautions and SLP POC  3. Pt will participate in further assessment of speech, language and cognition to determine ongoing POC needs MET  Additional Goals  4. Pt will utilize clear speech strategies at conversation level to improve intelligibility to 100% given supervision.                        Plan:     Patient to be seen:  4 x/week   Plan of Care expires:  01/23/23  Plan of Care reviewed with:  patient, spouse   SLP Follow-Up:  Yes       Discharge recommendations:  rehabilitation facility     Time Tracking:     SLP Treatment Date:   12/30/22  Speech Start Time:  1130  Speech Stop Time:  1152     Speech Total Time (min):  22 min    Billable Minutes: Speech Therapy Individual 10 and Self Care/Home Management Training 10    12/30/2022

## 2022-12-30 NOTE — PROGRESS NOTES
"[facility] - Neurosurgery ([facility])  Vascular Neurology  Comprehensive Stroke Center  Progress Note    Assessment/Plan:     * New infarction of cerebellum  38 y/o female with 3 week history of headache and cracked her neck tonight and headache got worse plus she started with dysarthri, had Ptosis nina. Seen in [facility] ED and no thrombolytics due to concern for discestion so transferred to [facility] ED fopr further evaluation and CTA head and neck reveals Abnormal segment of focal caliber change with luminal narrowing and irregularity involving the V2 segment of the left vertebral artery.  Additionally, there is focal luminal narrowing and irregularity involving the V3 segment of the right vertebral artery. MRI Brain reveals Several apparent small subtle foci of restricted diffusion within the bilateral cerebellar hemispheres as well as additional punctate focus within the paramedian posterior right occipital lobe suspicious for punctate acute infarcts.  There is subtle asymmetric diffusion hyperintensity within the left lara lynn and additional region of acute ischemia not excluded.     Patient is pending authorization for IPR. Neuro exam stable.    Antithrombotics: ASA 81 mg daily    Statins: Lipitor 40 mg daily    Aggressive risk factor modification: Diet, Exercise, Obesity, cracking her neck almost daily     Rehab efforts: The patient has been evaluated by a stroke team provider and the therapy needs have been fully considered based off the presenting complaints and exam findings. The following therapy evaluations are needed: PT evaluate and treat, OT evaluate and treat, SLP evaluate and treat    Diagnostics ordered/pending: Other: MBBS    VTE prophylaxis: Enoxaparin 40 mg SQ every 24 hours  Mechanical prophylaxis: Place SCDs    BP parameters: Infarct: No intervention, SBP <180      Vertebral artery dissection  Bilateral  Please see "New infarction of cerebellum"    Diplopia  Binocular horizontal " diplopia  Persistent since stroke, mild improvement. Will be going to rehab but L. ophthalmoplegia   Outpatient referral for Neuro-ophthalmology   Please see New infarction of cerebellum    Ptosis, bilateral  Due to stroke  Resolving partially    Class 2 obesity due to excess calories with body mass index (BMI) of 37.0 to 37.9 in adult  Stroke risk factor  Counseled on diet and exercise         12/26/2022- VSS. Neuroexam stable. NPO for concern of aspiration  12/27/2022- VSS, Neuroexam stable. MBBS today. Diet advanced to regular diet and nectar thick.  12/28/2022 - VSS, Neuroexam stable. IPR pending. C-collar being used.  12/29/2022- VSS, Neuro exam with minor improvement. Pending IPR, possible d/c today. Eye patch ordered. Patient more labile today but does not want medications or therapy.        STROKE DOCUMENTATION   Acute Stroke Times   Last Known Normal Date: 12/24/22  Last Known Normal Time: 2200  Symptom Onset Date: 12/24/20  Symptom Onset Time: 2200  Stroke Team Called Date: 12/25/22  Stroke Team Called Time: 0124  Stroke Team Arrival Date: 12/25/20  Stroke Team Arrival Time: 0130  CT Interpretation Time: 0000  Thrombolytic Therapy Recommended: No  CTA Interpretation Time: 0152  Thrombectomy Recommended: No    NIH Scale:          Modified [place] Score: 3  [place] Coma Scale:    ABCD2 Score:    [id number] Score:   HAS -BLED Score:   ICH Score:   Hunt & [place]i Classification:      Hemorrhagic change of an Ischemic Stroke: Does this patient have an ischemic stroke with hemorrhagic changes? No     Neurologic Chief Complaint: Bilateral Vertebral Artery dissections    Subjective:     HPI, Past Medical, Family, and Social History remains the same as documented in the initial encounter.     Review of Systems   Constitutional:  Negative for chills and fever.   HENT:  Negative for drooling and ear discharge.    Eyes:  Positive for visual disturbance. Negative for pain.   Respiratory:  Negative for cough and shortness  of breath.    Cardiovascular:  Negative for chest pain and leg swelling.   Gastrointestinal:  Negative for abdominal distention, abdominal pain, constipation, diarrhea, nausea and vomiting.   Endocrine: Negative for polydipsia and polyphagia.   Genitourinary:  Negative for dysuria and enuresis.   Musculoskeletal:  Negative for arthralgias and back pain.   Skin:  Negative for rash and wound.   Allergic/Immunologic: Negative for food allergies and immunocompromised state.   Neurological:  Positive for speech difficulty, numbness and headaches.   Hematological:  Does not bruise/bleed easily.   Psychiatric/Behavioral:  Positive for dysphoric mood. Negative for agitation, confusion and sleep disturbance. The patient is nervous/anxious.    Scheduled Meds:   aspirin  81 mg Oral Daily    atorvastatin  40 mg Oral Daily    enoxaparin  40 mg Subcutaneous Daily     Continuous Infusions:   sodium chloride 0.9%       PRN Meds:acetaminophen, ALPRAZolam, labetaloL, methocarbamoL, ondansetron, sodium chloride 0.9%, sodium chloride 0.9%    Objective:     Vital Signs (Most Recent):  Temp: 98.6 °F (37 °C) (12/30/22 0840)  Pulse: 79 (12/30/22 0840)  Resp: 18 (12/30/22 0840)  BP: (!) 130/91 (12/30/22 0840)  SpO2: 97 % (12/30/22 0840)  BP Location: Left arm    Vital Signs Range (Last 24H):  Temp:  [97.7 °F (36.5 °C)-98.9 °F (37.2 °C)]   Pulse:  []   Resp:  [16-20]   BP: (115-145)/(70-91)   SpO2:  [93 %-97 %]   BP Location: Left arm    Physical Exam  Vitals and nursing note reviewed. Exam conducted with a chaperone present.   Constitutional:       Appearance: Normal appearance. She is obese.   HENT:      Head: Normocephalic and atraumatic.   Eyes:      Extraocular Movements:      Left eye: Abnormal extraocular motion present.      Conjunctiva/sclera: Conjunctivae normal.      Pupils: Pupils are equal, round, and reactive to light.      Comments: Improving mobility and conjugate gaze   Cardiovascular:      Rate and Rhythm: Normal  rate and regular rhythm.   Pulmonary:      Effort: Pulmonary effort is normal.      Breath sounds: Normal breath sounds.   Abdominal:      General: Abdomen is flat. Bowel sounds are normal.      Palpations: Abdomen is soft.   Musculoskeletal:         General: Normal range of motion.      Cervical back: Normal range of motion.   Skin:     General: Skin is warm and dry.   Neurological:      Mental Status: She is alert and oriented to person, place, and time.      Cranial Nerves: Cranial nerve deficit present.      Sensory: Sensory deficit present.      Coordination: Coordination abnormal.      Comments: Dysarthria, ptosis     Psychiatric:         Mood and Affect: Mood is depressed. Affect is labile and tearful.         Speech: Speech is slurred.         Behavior: Behavior normal.       Neurological Exam:   LOC: alert  Attention Span: Good   Language: No aphasia  Articulation: Dysarthria  Orientation: Person, Place, Time   Visual Fields: Hemianopsia left  EOM (CN III, IV, VI): Ptosis bilaterally, disconjugate gaze, L. Eye ophthalmoplegia   Pupils (CN II, III): PERRL  Facial Sensation (CN V): Normal  Facial Movement (CN VII): Symmetric facial expression    Gag Reflex: present  Reflexes: flexor plantar responses bilaterally  Motor: Arm left  Normal 5/5  Leg left  Normal 5/5  Arm right  Normal 5/5  Leg right Normal 5/5  Cerebellum: Upper Extremity Appendicular Ataxia (Finger Nose Finger)  Left only when both eyes open  Sensation: Govind-hypoesthesia left  Tone: Normal tone throughout       Laboratory:  CMP:   No results for input(s): GLUCOSE, CALCIUM, ALBUMIN, PROT, NA, K, CO2, CL, BUN, CREATININE, ALKPHOS, ALT, AST, BILITOT in the last 24 hours.    BMP:   Recent Labs   Lab 12/27/22  0536      K 4.2   *   CO2 18*   BUN 11   CREATININE 0.7   CALCIUM 8.2*       CBC:   Recent Labs   Lab 12/27/22  0535   WBC 6.94   RBC 4.52   HGB 13.1   HCT 41.5      MCV 92   MCH 29.0   MCHC 31.6*       Lipid Panel:   Recent  Labs   Lab 12/25/22  0346   CHOL 179   LDLCALC 109.4   HDL 56   TRIG 68       Coagulation:   Recent Labs   Lab 12/25/22 0346   INR 1.0   APTT 25.0       Hgb A1C:   Recent Labs   Lab 12/25/22 0346   HGBA1C 4.8       TSH:   Recent Labs   Lab 12/25/22 0346   TSH 1.318         Diagnostic Results     Brain imaging:  CT Head w/o contrast 12-24-22 results:  No acute intracranial process identified     MRI Brain w/o contrast 12-25-22 results:    1.  Several apparent small subtle foci of restricted diffusion within the bilateral cerebellar hemispheres as well as additional punctate focus within the paramedian posterior right occipital lobe suspicious for punctate acute infarcts.  There is subtle asymmetric diffusion hyperintensity within the left lara lynn and additional region of acute ischemia not excluded.  Further evaluation and follow-up as warranted.     2.  Apparent small region of encephalomalacia within the left cerebellar hemisphere which may represent a remote infarct.  Additional possible punctate lacunar type infarct within the right superior cerebellar hemisphere.     3.  Abnormal T2 flow void involving the V3 segment of the right vertebral artery in this patient with suspected vertebral artery dissection.     4.  Several scattered foci of periventricular T2/FLAIR hyperintensity.  These lesions are nonspecific in appearance and may be seen with accelerated small vessel ischemic disease, vasculopathies, migraine headaches, and as the residua from inflammatory and traumatic insults to the brain.     Preliminary findings were discussed with Ede GAINES by myself at 03:31 on 12/25/2022.     Vessel Imaging:  CTA Head and neck 12-25-22 results:  1.  Abnormal segment of focal caliber change with luminal narrowing and irregularity involving the V2 segment of the left vertebral artery.  Additionally, there is focal luminal narrowing and irregularity involving the V3 segment of the right vertebral artery.  Findings are  concerning for possible bilateral vertebral artery dissection.  Vascular neurology consultation advised.  Further assessment could be performed with conventional angiography as warranted.     2.  Persistent asymmetric wedge-shaped region of parenchymal hypoattenuation within the left cerebellar hemisphere.  There is apparent contrast enhancement on the delayed series.  This is nonspecific although may relate to subacute infarct.  More sensitive assessment for ischemic could be performed with MRI if there are no patient contraindications.     3.  No evidence of cervical spine fracture.  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     Cardiac Evaluation:   EKG 12-24-22 results:  Normal sinus rhythm  Normal ECG  No previous ECGs available    TTE 12/25/22   The left ventricle is normal in size with normal systolic function.   The estimated ejection fraction is 65%.   Normal left ventricular diastolic function.   Normal right ventricular size with normal right ventricular systolic function.   Normal central venous pressure (3 mmHg).   The estimated PA systolic pressure is 27 mmHg.            Sundar Norwood MD  Comprehensive Stroke Center  Department of Vascular Neurology   [facility])

## 2022-12-30 NOTE — PT/OT/SLP PROGRESS
Physical Therapy Treatment    Patient Name:  Madeline Carlton Ma   MRN:  [id number]    Recommendations:     Discharge Recommendations: rehabilitation facility  Discharge Equipment Recommendations: walker, rolling, shower chair  Barriers to discharge: None    Assessment:     Madeline Carlton Ma is a 37 y.o. female admitted with a medical diagnosis of New infarction of cerebellum.  She presents with the following impairments/functional limitations: weakness, impaired endurance, impaired self care skills, impaired functional mobility, gait instability, impaired balance, decreased coordination, decreased safety awareness, decreased lower extremity function, decreased upper extremity function. Pt participated, and tolerated treatment well. Pt will continue to benefit from rehab services to improve all deficits noted above. Resume PT POC as indicated.     Rehab Prognosis: Good; patient would benefit from acute skilled PT services to address these deficits and reach maximum level of function.    Recent Surgery: * No surgery found *      Plan:     During this hospitalization, patient to be seen 4 x/week to address the identified rehab impairments via gait training, therapeutic activities, therapeutic exercises, neuromuscular re-education and progress toward the following goals:    Plan of Care Expires:  01/25/23    Subjective     Chief Complaint: none stated  Patient/Family Comments/goals: none stated  Pain/Comfort:  Pain Rating 1: 0/10  Pain Rating Post-Intervention 1: 0/10      Objective:     Communicated with nursing prior to session.  Patient found HOB elevated with  (all lines intact) upon PT entry to room.     General Precautions: Standard, aspiration, fall, nectar thick  Orthopedic Precautions: N/A  Braces: N/A  Respiratory Status: Room air     Functional Mobility:  Bed Mobility:  Scooting: contact guard assistance  Supine to Sit: contact guard assistance  Sit to Supine: contact guard assistance  Transfers:  Sit to  Stand:  stand by assistance with rolling walker  Gait: 28ft with RW and Mod A       AM-PAC 6 CLICK MOBILITY  Turning over in bed (including adjusting bedclothes, sheets and blankets)?: 4  Sitting down on and standing up from a chair with arms (e.g., wheelchair, bedside commode, etc.): 3  Moving from lying on back to sitting on the side of the bed?: 3  Moving to and from a bed to a chair (including a wheelchair)?: 3  Need to walk in hospital room?: 3  Climbing 3-5 steps with a railing?: 1  Basic Mobility Total Score: 17       Treatment & Education:  -BLE therex x10 reps   -Questions answered within PTA scope of practice.     Patient left HOB elevated with all lines intact, call button in reach, and nursing notified..    GOALS:   Multidisciplinary Problems       Physical Therapy Goals          Problem: Physical Therapy    Goal Priority Disciplines Outcome Goal Variances Interventions   Physical Therapy Goal     PT, PT/OT Ongoing, Progressing     Description: Goals to met by 1/9/2023    1. Sit to stand transfer with [place]  2. Bed to chair transfer with [place] using LRAD  3. Gait  x 150 feet with Supervision using LRAD   4. Ascend/descend 15 stair(s) with bilateral Handrails Stand-by Assistance using LRAD.   5. Stand for 10 minutes with Stand-by Assistance using LRAD  6. Lower extremity exercise program x15 reps per Instruction, with assistance as needed in order to facilitate improved postural control and improvement in functional independence                         Time Tracking:     PT Received On: 12/30/22  PT Start Time: 0837     PT Stop Time: 0853  PT Total Time (min): 16 min     Billable Minutes: Gait Training 16    Treatment Type: Treatment  PT/PTA: PTA     PTA Visit Number: 1     12/30/2022

## 2022-12-30 NOTE — ASSESSMENT & PLAN NOTE
36 y/o female with 3 week history of headache and cracked her neck tonight and headache got worse plus she started with dysarthri, had Ptosis nina. Seen in [facility] ED and no thrombolytics due to concern for discestion so transferred to [facility] ED fopr further evaluation and CTA head and neck reveals Abnormal segment of focal caliber change with luminal narrowing and irregularity involving the V2 segment of the left vertebral artery.  Additionally, there is focal luminal narrowing and irregularity involving the V3 segment of the right vertebral artery. MRI Brain reveals Several apparent small subtle foci of restricted diffusion within the bilateral cerebellar hemispheres as well as additional punctate focus within the paramedian posterior right occipital lobe suspicious for punctate acute infarcts.  There is subtle asymmetric diffusion hyperintensity within the left lara lynn and additional region of acute ischemia not excluded.     Patient is pending authorization for IPR. Neuro exam stable.    Antithrombotics: ASA 81 mg daily    Statins: Lipitor 40 mg daily    Aggressive risk factor modification: Diet, Exercise, Obesity, cracking her neck almost daily     Rehab efforts: The patient has been evaluated by a stroke team provider and the therapy needs have been fully considered based off the presenting complaints and exam findings. The following therapy evaluations are needed: PT evaluate and treat, OT evaluate and treat, SLP evaluate and treat    Diagnostics ordered/pending: Other: MBBS    VTE prophylaxis: Enoxaparin 40 mg SQ every 24 hours  Mechanical prophylaxis: Place SCDs    BP parameters: Infarct: No intervention, SBP <180

## 2022-12-30 NOTE — PLAN OF CARE
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       Allergies:  Review of patient's allergies indicates:   Allergen Reactions    Promethazine Other (See Comments)     Nervous reaction       Diet: regular diet    Activities: Activity as tolerated    Goals of Care Treatment Preferences:  Code Status: Full Code      Nursing: Please encourage c-collar when patient is being active, no need to wear all the time. No fracture.       CONSULTS:    Physical Therapy to evaluate and treat. , Occupational Therapy to evaluate and treat., Speech Therapy to evaluate and treat for Language and Cognition., and  to evaluate for community resources/long-range planning.    MISCELLANEOUS CARE:  Fall risk from lower leg ataxia    WOUND CARE ORDERS  None    Medications: Review discharge medications with patient and family and provide education.      Current Discharge Medication List        START taking these medications    Details   aspirin (ECOTRIN) 81 MG EC tablet Take 1 tablet (81 mg total) by mouth once daily.  Qty: 360 tablet, Refills: 0      atorvastatin (LIPITOR) 40 MG tablet Take 1 tablet (40 mg total) by mouth once daily.  Qty: 90 tablet,  Refills: 3           CONTINUE these medications which have NOT CHANGED    Details   ondansetron (ZOFRAN-ODT) 8 MG TbDL LET 1 TABLET DISSOLVE TWICE A DAY AS NEEDED FOR NAUSEA  Qty: 20 tablet, Refills: 1           STOP taking these medications       butalbital-acetaminophen-caffeine -40 mg (FIORICET, ESGIC) -40 mg per tablet Comments:   Reason for Stopping:                  Immunizations Administered as of 12/30/2022       Name Date Dose VIS Date Route Exp Date    COVID-19, MRNA, LN-S, PF (Pfizer) (Purple Cap) 9/30/2021 0.3 mL -- Intramuscular --    Site: Left deltoid     : [employer]     Lot: [id number]     COVID-19, MRNA, LN-S, PF (Pfizer) (Purple Cap) 3/29/2021 11:45 AM 0.3 mL 12/12/2020 Intramuscular 7/31/2021    Site: Left deltoid     Given By: Sera Mirza RN     : [employer]     Lot: [id number]     COVID-19, MRNA, LN-S, PF (Pfizer) (Purple Cap) 3/8/2021  3:57 PM 0.3 mL 12/12/2020 Intramuscular 6/30/2021    Site: Left deltoid     Given By: Tracy Subramanian RN     : [employer]     Lot: [id number]                 Some patients may experience side effects after vaccination.  These may include fever, headache, muscle or joint aches.  Most symptoms resolve with 24-48 hours and do not require urgent medical evaluation unless they persist for more than 72 hours or symptoms are concerning for an unrelated medical condition.          _________________________________  Sundar Norwood MD  12/30/2022

## 2022-12-30 NOTE — NURSING
Patient is discharged to [facility].Called report to nurse receiving patient and answered all questions.transport to  patient at 1230.Will continue to monitor.

## 2022-12-30 NOTE — SUBJECTIVE & OBJECTIVE
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     Review of Systems   Constitutional:  Negative for chills and fever.   HENT:  Negative for drooling and ear discharge.    Eyes:  Positive for visual disturbance. Negative for pain.   Respiratory:  Negative for cough and shortness of breath.    Cardiovascular:  Negative for chest pain and leg swelling.   Gastrointestinal:  Negative for abdominal distention, abdominal pain, constipation, diarrhea, nausea and vomiting.   Endocrine: Negative for polydipsia and polyphagia.   Genitourinary:  Negative for dysuria and enuresis.   Musculoskeletal:  Negative for arthralgias and back pain.   Skin:  Negative for rash and wound.   Allergic/Immunologic: Negative for food allergies and immunocompromised state.   Neurological:  Positive for speech difficulty, numbness and headaches.   Hematological:  Does not bruise/bleed easily.   Psychiatric/Behavioral:  Positive for dysphoric mood. Negative for agitation, confusion and sleep disturbance. The patient is nervous/anxious.    Scheduled Meds:   aspirin  81 mg Oral Daily    atorvastatin  40 mg Oral Daily    enoxaparin  40 mg Subcutaneous Daily     Continuous Infusions:   sodium chloride 0.9%       PRN Meds:acetaminophen, ALPRAZolam, labetaloL, methocarbamoL, ondansetron, sodium chloride 0.9%, sodium chloride 0.9%    Objective:     Vital Signs (Most Recent):  Temp: 98.6 °F (37 °C) (12/30/22 0840)  Pulse: 79 (12/30/22 0840)  Resp: 18 (12/30/22 0840)  BP: (!) 130/91 (12/30/22 0840)  SpO2: 97 % (12/30/22 0840)  BP Location: Left arm    Vital Signs Range (Last 24H):  Temp:  [97.7 °F (36.5 °C)-98.9 °F (37.2 °C)]   Pulse:  []   Resp:  [16-20]   BP: (115-145)/(70-91)   SpO2:  [93 %-97 %]   BP Location: Left arm    Physical Exam  Vitals and nursing note reviewed. Exam conducted with a chaperone  present.   Constitutional:       Appearance: Normal appearance. She is obese.   HENT:      Head: Normocephalic and atraumatic.   Eyes:      Extraocular Movements:      Left eye: Abnormal extraocular motion present.      Conjunctiva/sclera: Conjunctivae normal.      Pupils: Pupils are equal, round, and reactive to light.      Comments: Improving mobility and conjugate gaze   Cardiovascular:      Rate and Rhythm: Normal rate and regular rhythm.   Pulmonary:      Effort: Pulmonary effort is normal.      Breath sounds: Normal breath sounds.   Abdominal:      General: Abdomen is flat. Bowel sounds are normal.      Palpations: Abdomen is soft.   Musculoskeletal:         General: Normal range of motion.      Cervical back: Normal range of motion.   Skin:     General: Skin is warm and dry.   Neurological:      Mental Status: She is alert and oriented to person, place, and time.      Cranial Nerves: Cranial nerve deficit present.      Sensory: Sensory deficit present.      Coordination: Coordination abnormal.      Comments: Dysarthria, ptosis     Psychiatric:         Mood and Affect: Mood is depressed. Affect is labile and tearful.         Speech: Speech is slurred.         Behavior: Behavior normal.       Neurological Exam:   LOC: alert  Attention Span: Good   Language: No aphasia  Articulation: Dysarthria  Orientation: Person, Place, Time   Visual Fields: Hemianopsia left  EOM (CN III, IV, VI): Ptosis bilaterally, disconjugate gaze, L. Eye ophthalmoplegia   Pupils (CN II, III): PERRL  Facial Sensation (CN V): Normal  Facial Movement (CN VII): Symmetric facial expression    Gag Reflex: present  Reflexes: flexor plantar responses bilaterally  Motor: Arm left  Normal 5/5  Leg left  Normal 5/5  Arm right  Normal 5/5  Leg right Normal 5/5  Cerebellum: Upper Extremity Appendicular Ataxia (Finger Nose Finger)  Left only when both eyes open  Sensation: Govind-hypoesthesia left  Tone: Normal tone throughout       Laboratory:  CMP:    No results for input(s): GLUCOSE, CALCIUM, ALBUMIN, PROT, NA, K, CO2, CL, BUN, CREATININE, ALKPHOS, ALT, AST, BILITOT in the last 24 hours.    BMP:   Recent Labs   Lab 12/27/22  0536      K 4.2   *   CO2 18*   BUN 11   CREATININE 0.7   CALCIUM 8.2*       CBC:   Recent Labs   Lab 12/27/22  0535   WBC 6.94   RBC 4.52   HGB 13.1   HCT 41.5      MCV 92   MCH 29.0   MCHC 31.6*       Lipid Panel:   Recent Labs   Lab 12/25/22  0346   CHOL 179   LDLCALC 109.4   HDL 56   TRIG 68       Coagulation:   Recent Labs   Lab 12/25/22 0346   INR 1.0   APTT 25.0       Hgb A1C:   Recent Labs   Lab 12/25/22 0346   HGBA1C 4.8       TSH:   Recent Labs   Lab 12/25/22 0346   TSH 1.318         Diagnostic Results     Brain imaging:  CT Head w/o contrast 12-24-22 results:  No acute intracranial process identified     MRI Brain w/o contrast 12-25-22 results:    1.  Several apparent small subtle foci of restricted diffusion within the bilateral cerebellar hemispheres as well as additional punctate focus within the paramedian posterior right occipital lobe suspicious for punctate acute infarcts.  There is subtle asymmetric diffusion hyperintensity within the left lara lynn and additional region of acute ischemia not excluded.  Further evaluation and follow-up as warranted.     2.  Apparent small region of encephalomalacia within the left cerebellar hemisphere which may represent a remote infarct.  Additional possible punctate lacunar type infarct within the right superior cerebellar hemisphere.     3.  Abnormal T2 flow void involving the V3 segment of the right vertebral artery in this patient with suspected vertebral artery dissection.     4.  Several scattered foci of periventricular T2/FLAIR hyperintensity.  These lesions are nonspecific in appearance and may be seen with accelerated small vessel ischemic disease, vasculopathies, migraine headaches, and as the residua from inflammatory and traumatic insults to the  brain.     Preliminary findings were discussed with Ede GAINES by myself at 03:31 on 12/25/2022.     Vessel Imaging:  CTA Head and neck 12-25-22 results:  1.  Abnormal segment of focal caliber change with luminal narrowing and irregularity involving the V2 segment of the left vertebral artery.  Additionally, there is focal luminal narrowing and irregularity involving the V3 segment of the right vertebral artery.  Findings are concerning for possible bilateral vertebral artery dissection.  Vascular neurology consultation advised.  Further assessment could be performed with conventional angiography as warranted.     2.  Persistent asymmetric wedge-shaped region of parenchymal hypoattenuation within the left cerebellar hemisphere.  There is apparent contrast enhancement on the delayed series.  This is nonspecific although may relate to subacute infarct.  More sensitive assessment for ischemic could be performed with MRI if there are no patient contraindications.     3.  No evidence of cervical spine fracture.  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     Cardiac Evaluation:   EKG 12-24-22 results:  Normal sinus rhythm  Normal ECG  No previous ECGs available    TTE 12/25/22  The left ventricle is normal in size with normal systolic function.  The estimated ejection fraction is 65%.  Normal left ventricular diastolic function.  Normal right ventricular size with normal right ventricular systolic function.  Normal central venous pressure (3 mmHg).  The estimated PA systolic pressure is 27 mmHg.

## 2022-12-31 NOTE — ASSESSMENT & PLAN NOTE
Patient's affect would fluctuate during conversations. Initially thought adjustment, but dysregulated emotional outbursts witnessed. Will have eruptive laugh and then soon after become tearful. Voice dysregulation, hyperphonia, and worsening prosody of speech (stuttering) during heightened emotions noted.    Patient was educated on the fact that this can be due to her brain injury, but reticent to begin treatment. She is hopeful things will resolve quickly.    --discussed possible benefit of nudexta

## 2022-12-31 NOTE — DISCHARGE SUMMARY
[facility] - Neurosurgery ([facility])  Vascular Neurology  Comprehensive Stroke Center  Discharge Summary     Summary:     Admit Date: 12/25/2022  1:24 AM    Discharge Date and Time: 12/30/2022  4:10 PM    Attending Physician: No att. providers found     Discharge Provider: Sundar Norwood MD    History of Present Illness: 38 y/o female who presented to the ED at [facility] evaluation of slurred speech.  She says that approximately 30 minutes prior to her arrival in the emergency department she cracked her neck as she frequently does but afterwards started having slurred speech as well as feeling like her left eye was drifting towards the midline.  She denies any double vision.  No headaches.  Says she has chronic neck pain.  No khanh extremity strength deficits.  She has been ambulatory.  Admits to drinking alcohol tonight, no other substances.  Does not think that her dysarthria is alcohol induced.  She was seen in telemedicine stroke by Dr Deluca and she presented within the window for IV thrombolysis; however, given concern for abnormal findings on her CTH, treatment was deferred.   Recommendation was to transfer to [facility] to be further evaluate with CTA head neck and them MRI brain.   Her symptoms are tongue numbness, ptosis, and dysarthria     CTA head and neck reveals CTA shows possible subacute left cerebellar infarct. CTA is concerning for short segment dissection of V2 segment on the left and the v3 segment on the right. 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  MRI Brain reveals Several small foci of restricted diffusion in the bilateral cerebellar hemispheres and the paramedian right occipital lobe concerning for punctate recent infarcts    Case discussed with Dr Rodgers Vascular Neurology Fellow and will admit to our service and place on ASA 81 mg only for now.          Hospital Course (synopsis of major diagnoses, care,  treatment, and services provided during the course of the hospital stay): 36 y/o female with 3 week history of headache and cracked her neck tonight and headache got worse plus she started with dysarthri, had Ptosis nina. Seen in [facility] ED and no thrombolytics due to concern for discestion so transferred to [facility] ED fopr further evaluation and CTA head and neck reveals Abnormal segment of focal caliber change with luminal narrowing and irregularity involving the V2 segment of the left vertebral artery.  Additionally, there is focal luminal narrowing and irregularity involving the V3 segment of the right vertebral artery. MRI Brain reveals several apparent small subtle foci of restricted diffusion within the bilateral cerebellar hemispheres as well as additional punctate focus within the paramedian posterior right occipital lobe suspicious for punctate acute infarcts.  There is subtle asymmetric diffusion hyperintensity within the left lara lynn and additional region of acute ischemia not excluded. Physical deficits slowly improved throughout hospitalization. She was still not safe to walk at discharge and required wheelchair. Biggest complaints were headache, double vision, poor coordination of legs and slurred speech. Pseudobulbar affect noted but patient and family would prefer to wait to see if affect will normalize post-discharge. Patient discharged to rehab today.        Goals of Care Treatment Preferences:  Code Status: Full Code      Stroke Etiology: Ischemic Other Causes Extracranial Dissection    STROKE DOCUMENTATION   Acute Stroke Times   Last Known Normal Date: 12/24/22  Last Known Normal Time: 2200  Symptom Onset Date: 12/24/20  Symptom Onset Time: 2200  Stroke Team Called Date: 12/25/22  Stroke Team Called Time: 0124  Stroke Team Arrival Date: 12/25/20  Stroke Team Arrival Time: 0130  CT Interpretation Time: 0000  Thrombolytic Therapy Recommended: No  CTA Interpretation Time:  0152  Thrombectomy Recommended: No      NIH Scale:  1a. Level of Consciousness: 0-->Alert, keenly responsive  1b. LOC Questions: 0-->Answers both questions correctly  1c. LOC Commands: 0-->Performs both tasks correctly  2. Best Gaze: 1-->Partial gaze palsy, gaze is abnormal in one or both eyes, but forced deviation or total gaze paresis is not present  3. Visual: 1-->Partial hemianopia  4. Facial Palsy: 0-->Normal symmetrical movements  5a. Motor Arm, Left: 0-->No drift, limb holds 90 (or 45) degrees for full 10 secs  5b. Motor Arm, Right: 0-->No drift, limb holds 90 (or 45) degrees for full 10 secs  6a. Motor Leg, Left: 0-->No drift, leg holds 30 degree position for full 5 secs  6b. Motor Leg, Right: 0-->No drift, leg holds 30 degree position for full 5 secs  7. Limb Ataxia: 0-->Absent  8. Sensory: 1-->Mild-to-moderate sensory loss, patient feels pinprick is less sharp or is dull on the affected side, or there is a loss of superficial pain with pinprick, but patient is aware of being touched  9. Best Language: 0-->No aphasia, normal  10. Dysarthria: 1-->Mild-to-moderate dysarthria, patient slurs at least some words and, at worst, can be understood with some difficulty  11. Extinction and Inattention (formerly Neglect): 0-->No abnormality  Total (NIH Stroke Scale): 4       Modified Racine Score: 3  Maurepas Coma Scale:    ABCD2 Score:    [id number] Score:   HAS -BLED Score:   ICH Score:   Hunt & Cali Classification:       Assessment/Plan:     Diagnostic Results:      Brain imaging:  CT Head w/o contrast 12-24-22 results:  No acute intracranial process identified     MRI Brain w/o contrast 12-25-22 results:  1.  Several apparent small subtle foci of restricted diffusion within the bilateral cerebellar hemispheres as well as additional punctate focus within the paramedian posterior right occipital lobe suspicious for punctate acute infarcts.  There is subtle asymmetric diffusion hyperintensity within the left lara  lynn and additional region of acute ischemia not excluded.  Further evaluation and follow-up as warranted.     2.  Apparent small region of encephalomalacia within the left cerebellar hemisphere which may represent a remote infarct.  Additional possible punctate lacunar type infarct within the right superior cerebellar hemisphere.     3.  Abnormal T2 flow void involving the V3 segment of the right vertebral artery in this patient with suspected vertebral artery dissection.     4.  Several scattered foci of periventricular T2/FLAIR hyperintensity.  These lesions are nonspecific in appearance and may be seen with accelerated small vessel ischemic disease, vasculopathies, migraine headaches, and as the residua from inflammatory and traumatic insults to the brain.     Preliminary findings were discussed with Ede GAINES by myself at 03:31 on 12/25/2022.     Vessel Imaging:  CTA Head and neck 12-25-22 results:  1.  Abnormal segment of focal caliber change with luminal narrowing and irregularity involving the V2 segment of the left vertebral artery.  Additionally, there is focal luminal narrowing and irregularity involving the V3 segment of the right vertebral artery.  Findings are concerning for possible bilateral vertebral artery dissection.  Vascular neurology consultation advised.  Further assessment could be performed with conventional angiography as warranted.     2.  Persistent asymmetric wedge-shaped region of parenchymal hypoattenuation within the left cerebellar hemisphere.  There is apparent contrast enhancement on the delayed series.  This is nonspecific although may relate to subacute infarct.  More sensitive assessment for ischemic could be performed with MRI if there are no patient contraindications.     3.  No evidence of cervical spine fracture.  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     Cardiac Evaluation:   EKG 12-24-22 results:  Normal sinus rhythm  Normal ECG  No previous  ECGs available     TTE 12/25/22   The left ventricle is normal in size with normal systolic function.   The estimated ejection fraction is 65%.   Normal left ventricular diastolic function.   Normal right ventricular size with normal right ventricular systolic function.   Normal central venous pressure (3 mmHg).   The estimated PA systolic pressure is 27 mmHg.    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 Initially thought adjustment, but dysregulated emotional outbursts witnessed. Will have eruptive laugh and then soon after become tearful. Voice dysregulation, hyperphonia, and worsening prosody of speech (stuttering) during heightened emotions noted.    Patient was educated on the fact that this can be due to her brain injury, but reticent to begin treatment. She is hopeful things will resolve quickly.    --discussed possible benefit of nudexta         Recommendations:     Post-discharge complication risks: Falls    Stroke Education given to: patient and family    Follow-up in Stroke Clinic in 4-6 weeks.     Discharge Plan:  Antithrombotics: Aspirin 81mg  Statin: Atorvastatin 40mg  Aggresive risk factor modification:  Hypertension  High Cholesterol  Diet  Exercise  Avoid straining cervical spine    Follow Up:   Follow-up Information     Hannah  MD Starr .    Specialty: Family Medicine  Contact information:  [address] [id number] [phone]                         Patient Instructions:      Ambulatory referral/consult to Ophthalmology   Standing Status: Future   Referral Priority: Routine Referral Type: Consultation   Referral Reason: Specialty Services Required   Requested Specialty: Ophthalmology   Number of Visits Requested: 1     Ambulatory referral/consult to Vascular Neurology   Standing Status: Future   Referral Priority: Routine Referral Type: Consultation   Referral Reason: Specialty Services Required   Requested Specialty: Vascular Neurology   Number of Visits Requested: 1     Ambulatory referral/consult to Outpatient Case Management   Referral Priority: Routine Referral Type: Consultation   Referral Reason: Specialty Services Required   Number of Visits Requested: 1     Call 911 for any of the following:   Order Comments: Call 911  right away if any of the following warning signs come on suddenly, even if the symptoms only last for a few minutes. With stroke, timing is very important.   - Warning Signs of Stroke:  - Weakness: You may feel a sudden weakness, tingling or loss of feeling on one side of your face or body.  - Vision Problems: You may have sudden double vision or trouble seeing in one or both eyes.  - Speech Problems: You may have sudden trouble talking, slured speech, or problems understanding others.  - Headache: You may have sudden, severe headache.  - Movement Problems: You may experience dizziness, a feeling of spinning, a loss of balance, a feeling of falling or blackouts.       Medications:  Reconciled Home Medications:      Medication List      START taking these medications    aspirin 81 MG EC tablet  Commonly known as: ECOTRIN  Take 1 tablet (81 mg total) by mouth once daily.     atorvastatin 40 MG tablet  Commonly known as: LIPITOR  Take 1 tablet (40 mg total) by mouth once daily.         CONTINUE taking these medications    ondansetron 8 MG Tbdl  Commonly known as: ZOFRAN-ODT  LET 1 TABLET DISSOLVE TWICE A DAY AS NEEDED FOR NAUSEA        STOP taking these medications    butalbital-acetaminophen-caffeine -40 mg -40 mg per tablet  Commonly known as: VEEORICMARIA FERNANDA ESGIC            Sundar Norwood MD  Comprehensive Stroke Center  Department of Vascular Neurology   [facility] - Neurosurgery [facility])

## 2023-01-10 ENCOUNTER — TELEPHONE (OUTPATIENT)
Dept: NEUROLOGY | Facility: CLINIC | Age: 38
End: 2023-01-10

## 2023-01-10 NOTE — TELEPHONE ENCOUNTER
----- Message from Licha Murray sent at 1/10/2023  9:06 AM CST -----  Contact: pt  Pt  requesting a callback to speak with a nurse to get short term paperwork fill out             Confirmed contact below:  Contact Name:Madeline Angeles  Phone Number: [phone]

## 2023-01-10 NOTE — TELEPHONE ENCOUNTER
Pt's  called asking about a short-term disability claim for the pt. Pt's  had questions about the specific doctor that would be signing it and what the process was. I explained that since Dr. Deluca is the provider that the f/u is with, she would be the one to sign the disability form even though it was Dr. Cruz who saw them in the hospital. I further explained that he can attach the form to a message in the portal and I will get it sent over to disability to be completed. Pt's  verbalized understanding.

## 2023-01-13 ENCOUNTER — PATIENT MESSAGE (OUTPATIENT)
Dept: NEUROLOGY | Facility: CLINIC | Age: 38
End: 2023-01-13
Payer: COMMERCIAL

## 2023-01-25 ENCOUNTER — PATIENT MESSAGE (OUTPATIENT)
Dept: NEUROLOGY | Facility: CLINIC | Age: 38
End: 2023-01-25
Payer: COMMERCIAL

## 2023-02-03 ENCOUNTER — OFFICE VISIT (OUTPATIENT)
Dept: NEUROLOGY | Facility: CLINIC | Age: 38
End: 2023-02-03
Payer: COMMERCIAL

## 2023-02-03 VITALS
HEIGHT: 61 IN | BODY MASS INDEX: 33.71 KG/M2 | SYSTOLIC BLOOD PRESSURE: 121 MMHG | HEART RATE: 74 BPM | WEIGHT: 178.56 LBS | DIASTOLIC BLOOD PRESSURE: 87 MMHG

## 2023-02-03 DIAGNOSIS — I77.74 VERTEBRAL ARTERY DISSECTION: ICD-10-CM

## 2023-02-03 DIAGNOSIS — H53.2 DIPLOPIA: Primary | ICD-10-CM

## 2023-02-03 PROCEDURE — 3074F SYST BP LT 130 MM HG: CPT | Mod: CPTII,S$GLB,, | Performed by: STUDENT IN AN ORGANIZED HEALTH CARE EDUCATION/TRAINING PROGRAM

## 2023-02-03 PROCEDURE — 3079F PR MOST RECENT DIASTOLIC BLOOD PRESSURE 80-89 MM HG: ICD-10-PCS | Mod: CPTII,S$GLB,, | Performed by: STUDENT IN AN ORGANIZED HEALTH CARE EDUCATION/TRAINING PROGRAM

## 2023-02-03 PROCEDURE — 3008F PR BODY MASS INDEX (BMI) DOCUMENTED: ICD-10-PCS | Mod: CPTII,S$GLB,, | Performed by: STUDENT IN AN ORGANIZED HEALTH CARE EDUCATION/TRAINING PROGRAM

## 2023-02-03 PROCEDURE — 99214 OFFICE O/P EST MOD 30 MIN: CPT | Mod: S$GLB,,, | Performed by: STUDENT IN AN ORGANIZED HEALTH CARE EDUCATION/TRAINING PROGRAM

## 2023-02-03 PROCEDURE — 3074F PR MOST RECENT SYSTOLIC BLOOD PRESSURE < 130 MM HG: ICD-10-PCS | Mod: CPTII,S$GLB,, | Performed by: STUDENT IN AN ORGANIZED HEALTH CARE EDUCATION/TRAINING PROGRAM

## 2023-02-03 PROCEDURE — 99999 PR PBB SHADOW E&M-EST. PATIENT-LVL III: ICD-10-PCS | Mod: PBBFAC,,, | Performed by: STUDENT IN AN ORGANIZED HEALTH CARE EDUCATION/TRAINING PROGRAM

## 2023-02-03 PROCEDURE — 3079F DIAST BP 80-89 MM HG: CPT | Mod: CPTII,S$GLB,, | Performed by: STUDENT IN AN ORGANIZED HEALTH CARE EDUCATION/TRAINING PROGRAM

## 2023-02-03 PROCEDURE — 1159F MED LIST DOCD IN RCRD: CPT | Mod: CPTII,S$GLB,, | Performed by: STUDENT IN AN ORGANIZED HEALTH CARE EDUCATION/TRAINING PROGRAM

## 2023-02-03 PROCEDURE — 3008F BODY MASS INDEX DOCD: CPT | Mod: CPTII,S$GLB,, | Performed by: STUDENT IN AN ORGANIZED HEALTH CARE EDUCATION/TRAINING PROGRAM

## 2023-02-03 PROCEDURE — 99999 PR PBB SHADOW E&M-EST. PATIENT-LVL III: CPT | Mod: PBBFAC,,, | Performed by: STUDENT IN AN ORGANIZED HEALTH CARE EDUCATION/TRAINING PROGRAM

## 2023-02-03 PROCEDURE — 1159F PR MEDICATION LIST DOCUMENTED IN MEDICAL RECORD: ICD-10-PCS | Mod: CPTII,S$GLB,, | Performed by: STUDENT IN AN ORGANIZED HEALTH CARE EDUCATION/TRAINING PROGRAM

## 2023-02-03 PROCEDURE — 99214 PR OFFICE/OUTPT VISIT, EST, LEVL IV, 30-39 MIN: ICD-10-PCS | Mod: S$GLB,,, | Performed by: STUDENT IN AN ORGANIZED HEALTH CARE EDUCATION/TRAINING PROGRAM

## 2023-02-03 RX ORDER — HYDROXYZINE PAMOATE 25 MG/1
25 CAPSULE ORAL 4 TIMES DAILY
COMMUNITY
End: 2023-02-14

## 2023-02-03 RX ORDER — POLYETHYLENE GLYCOL 3350
POWDER (GRAM) MISCELLANEOUS
COMMUNITY
End: 2023-02-14

## 2023-02-03 RX ORDER — NYSTATIN 100000 U/G
CREAM TOPICAL 2 TIMES DAILY
COMMUNITY
End: 2023-02-14

## 2023-02-03 RX ORDER — SERTRALINE HYDROCHLORIDE 50 MG/1
50 TABLET, FILM COATED ORAL DAILY
COMMUNITY
End: 2023-02-14

## 2023-02-03 RX ORDER — MELATONIN 3 MG
CAPSULE ORAL
COMMUNITY
End: 2023-02-14

## 2023-02-03 RX ORDER — ACETAMINOPHEN 325 MG/1
325 TABLET ORAL EVERY 6 HOURS PRN
COMMUNITY
End: 2023-02-14

## 2023-02-03 RX ORDER — DEXTROMETHORPHAN HYDROBROMIDE AND QUINIDINE SULFATE 20; 10 MG/1; MG/1
1 CAPSULE, GELATIN COATED ORAL
COMMUNITY
End: 2023-02-14

## 2023-02-03 RX ORDER — HYDRALAZINE HYDROCHLORIDE 25 MG/1
25 TABLET, FILM COATED ORAL 3 TIMES DAILY
COMMUNITY
End: 2023-02-14

## 2023-02-03 RX ORDER — FLUOXETINE 10 MG/1
TABLET ORAL DAILY
COMMUNITY
End: 2023-02-14 | Stop reason: SDUPTHER

## 2023-02-03 RX ORDER — AMITRIPTYLINE HYDROCHLORIDE 25 MG/1
25 TABLET, FILM COATED ORAL NIGHTLY PRN
COMMUNITY
End: 2023-02-14

## 2023-02-03 NOTE — PROGRESS NOTES
Vascular Neurology Clinic  Hospital Follow-up Clinic Visit    Patient Name: Madeline Carlton Ma  MRN: [id number]  Date: 2/3/23  Referring Provider: Dr. Sundar Norwood    CC: Ischemic stroke    SUBJECTIVE:      History of Present Illness: Madeline Carlton Ma is a 37 y.o. female with a past medical history significant for migraine headaches who presents to clinic for follow-up for a recent admission for stroke.     She initially presented to [facility] with slurred speech, fatigue,bilateral ptosis, tongue numbness after doing a cervical manipulation to her neck at home. She presented within the therapeutic window for IV thrombolysis; however, given abnormal findings on CTH (with concern for possible intracranial mass), thrombolysis was deferred. [facility] did not have capability for CTA, so she was transferred to [facility] for additional work-up. CTA head/neck showed bilateral vertebral artery dissections. MRI brain revealed small foci of restricted diffusion in the bilateral cerebellar hemispheres and the right occipital lobe. Of note, two weeks prior to her presentation, she had headache with aura. CTH showed a hypodensity in the left cerebellum, which likely represented infarction.     She was admitted to the VN service from 12/25-12/30/22. Etiology of her strokes were secondary to vertebral artery dissections from high-velocity self neck adjustment. Her symptoms improved over the course of the admission and she was discharged to [facility] on ASA 81mg and atorvastatin. Pseudobulbar affect was noted.      We reviewed medications from her [facility] today. There is concern for multiple serotoninergic agents, which have been used to treat her pseudobulbar affect and anxiety/depression, including elavil, fluoxetine, and buspirone. She is also on nuedexta. Elavil and melatonin were previously used to facilitate sleep. She has been without elavil since she has been home and reports no issues with sleep. Nuedexta appeared  to help with her mood; however, she had started fluoxetine 2 weeks prior to starting this medication. She has also been without buspirone for the past couple of days since she has been out of IPR and feels that her anxiety overall has been stable. ROS + mild diplopia and dysarthria. No dysphagia. No recent falls. She appears tearful when discussing her stroke care in clinic today, but reports that overall she has been feeling better. She has adjusted her neck a few times since she was admitted due to habit. Her  has been monitoring this.       Etiology: Posterior circulation strokes 2/2 bilateral vertebral artery dissections from high-velocity self neck manipulations  Intervention: None    Work-up:    Imaging:  - [facility] (12/21/2022): Wedge-shaped 2.2 x 1.0 by 0.8 cm focus in the left cerebellum laterally with 2nd similar focus 0.5 cm similar focus ventral to it both questionable minimal patchy enhancement versus just a blood vessels passing through/around them. These foci are of unknown etiology, but differential considerations include encephalomalacia from chronic infarctions with the without some patchy enhancement from luxury perfusion, sequela from remote trauma, and benign and malignant masses. Recommend an MRI of the brain with and without contrast for further evaluation.    - CTA head and neck (12/25/2022):  1.  Abnormal segment of focal caliber change with luminal narrowing and irregularity involving the V2 segment of the left vertebral artery.  Additionally, there is focal luminal narrowing and irregularity involving the V3 segment of the right vertebral artery.  Findings are concerning for possible bilateral vertebral artery dissection.  Vascular neurology consultation advised.  Further assessment could be performed with conventional angiography as warranted.  2.  Persistent asymmetric wedge-shaped region of parenchymal hypoattenuation within the left cerebellar hemisphere.  There is apparent  contrast enhancement on the delayed series.  This is nonspecific although may relate to subacute infarct.  More sensitive assessment for ischemic could be performed with MRI if there are no patient contraindications.   3.  No evidence of cervical spine fracture.  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     - MRI brain without contrast (12/25/2022):  1.  Several apparent small subtle foci of restricted diffusion within the bilateral cerebellar hemispheres as well as additional punctate focus within the paramedian posterior right occipital lobe suspicious for punctate acute infarcts.  There is subtle asymmetric diffusion hyperintensity within the left lara lynn and additional region of acute ischemia not excluded.  Further evaluation and follow-up as warranted.  2.  Apparent small region of encephalomalacia within the left cerebellar hemisphere which may represent a remote infarct.  Additional possible punctate lacunar type infarct within the right superior cerebellar hemisphere.  3.  Abnormal T2 flow void involving the V3 segment of the right vertebral artery in this patient with suspected vertebral artery dissection.  4.  Several scattered foci of periventricular T2/FLAIR hyperintensity.  These lesions are nonspecific in appearance and may be seen with accelerated small vessel ischemic disease, vasculopathies, migraine headaches, and as the residua from inflammatory and traumatic insults to the brain.      Cardiac Evaluation:  - TTE (12/25/2022)  The left ventricle is normal in size with normal systolic function.  The estimated ejection fraction is 65%.  Normal left ventricular diastolic function.  Normal right ventricular size with normal right ventricular systolic function.  Normal central venous pressure (3 mmHg).  The estimated PA systolic pressure is 27 mmHg.    - Cardiac monitor: No atrial arrhythmias identified when monitored in the inpatient setting    Labs:  Recent Labs   Lab  12/25/22  0346   Hemoglobin A1C 4.8   LDL Cholesterol 109.4   HDL 56   Triglycerides 68   Cholesterol 179         Review of Systems: The following systems were reviewed with pertinent positives and negatives documented in the HPI: constitutional, eyes, CV, respiratory, GI, , musculoskeletal, skin, neurological, psychiatric    Past Medical History:  Past Medical History:   Diagnosis Date    Migraine headache     Ptosis, bilateral 12/24/2022       Medications:    Current Outpatient Medications:     acetaminophen (TYLENOL) 325 MG tablet, Take 325 mg by mouth every 6 (six) hours as needed for Pain., Disp: , Rfl:     amitriptyline (ELAVIL) 25 MG tablet, Take 25 mg by mouth nightly as needed for Insomnia., Disp: , Rfl:     aspirin (ECOTRIN) 81 MG EC tablet, Take 1 tablet (81 mg total) by mouth once daily., Disp: 360 tablet, Rfl: 0    atorvastatin (LIPITOR) 40 MG tablet, Take 1 tablet (40 mg total) by mouth once daily., Disp: 90 tablet, Rfl: 3    bisacodyL (FLEET) 10 mg/30 mL Enem, Place 10 mg rectally once., Disp: , Rfl:     dextromethorphan-quinidine 20-10 mg (NUEDEXTA) 20-10 mg per capsule, Take 1 capsule by mouth every 12 (twelve) hours., Disp: , Rfl:     FLUoxetine 10 MG Tab, Take by mouth once daily., Disp: , Rfl:     hydrALAZINE (APRESOLINE) 25 MG tablet, Take 25 mg by mouth 3 (three) times daily., Disp: , Rfl:     hydrOXYzine pamoate (VISTARIL) 25 MG Cap, Take 25 mg by mouth 4 (four) times daily., Disp: , Rfl:     melatonin 3 mg Cap, Take by mouth., Disp: , Rfl:     nystatin (MYCOSTATIN) cream, Apply topically 2 (two) times daily., Disp: , Rfl:     ondansetron (ZOFRAN-ODT) 8 MG TbDL, LET 1 TABLET DISSOLVE TWICE A DAY AS NEEDED FOR NAUSEA, Disp: 20 tablet, Rfl: 1    polyethylene glycol 3350,bulk, Powd, by Misc.(Non-Drug; Combo Route) route., Disp: , Rfl:     sertraline (ZOLOFT) 50 MG tablet, Take 50 mg by mouth once daily., Disp: , Rfl:   Any other notable medications as documented in HPI.    Allergies:  Review of  patient's allergies indicates:   Allergen Reactions    Promethazine Other (See Comments)     Nervous reaction       Social History:  Social History     Socioeconomic History    Marital status:    Tobacco Use    Smoking status: Former     Types: Cigarettes     Quit date: 2013     Years since quittin.5    Smokeless tobacco: Never   Substance and Sexual Activity    Alcohol use: Yes     Alcohol/week: 0.0 standard drinks     Comment: 2 time per week    Drug use: No    Sexual activity: Yes     Social Determinants of Health     Financial Resource Strain: Low Risk     Difficulty of Paying Living Expenses: Not hard at all   Food Insecurity: No Food Insecurity    Worried About Running Out of Food in the Last Year: Never true    Ran Out of Food in the Last Year: Never true   Transportation Needs: No Transportation Needs    Lack of Transportation (Medical): No    Lack of Transportation (Non-Medical): No   Physical Activity: Insufficiently Active    Days of Exercise per Week: 6 days    Minutes of Exercise per Session: 20 min   Stress: No Stress Concern Present    Feeling of Stress : Not at all   Social Connections: Unknown    Frequency of Communication with Friends and Family: Never    Frequency of Social Gatherings with Friends and Family: Twice a week    Active Member of Clubs or Organizations: No    Attends Club or Organization Meetings: Patient refused    Marital Status:    Housing Stability: Low Risk     Unable to Pay for Housing in the Last Year: No    Number of Places Lived in the Last Year: 1    Unstable Housing in the Last Year: No     Any other notable Social History as documented in HPI.    Family History:  Family History   Problem Relation Age of Onset    Ovarian cancer Mother 34    Hyperlipidemia Father     Ovarian cancer Maternal Aunt         age unknown    Breast cancer Paternal Grandmother         50's     Any other notable FMH as documented in HPI.      OBJECTIVE:     Physical Exam:    Vitals:    02/03/23 0954   BP: 121/87   Pulse: 74     GEN: NAD, pleasant, cooperative  CV: RRR  PULM: No signs of resp distress, on room air  EXT: No cyanosis, clubbing, or edema.    NEURO:  Mental status: The patient is alert, attentive, and oriented to self, age, month, year, situation. Pseudobulbar affect - becomes tearful when discussing stroke care/medications.   Speech: Slow prosody and forced intonation with intact repetition, comprehension, and naming. Speech is dysarthric, but intelligible.     Cranial nerves:  CN II: Visual fields are full to confrontation. Pupils are 3mm and reactive to light OU.  CN III, IV, VI: EOMI OS, no nystagmus, no ptosis, right cranial nerve 6 palsy  CN V: Facial sensation is intact in all 3 divisions bilaterally.  CN VII: Face is symmetric with normal eye closure and smile.  CN VIII: Hearing is normal bilaterally.  CN IX, X: Palate elevates symmetrically.   CN XI: Head turning and shoulder shrug are intact.  CN XII: Tongue is midline with normal movements and no atrophy.    Motor: Muscle bulk and tone are normal. No pronator drift. 4+/5 hip flexion bilaterally, otherwise 5/5 strength throughout    Reflexes: Reflexes are 2+ and symmetric at the biceps, triceps, knees.    Sensory: Mild decreased sensation in her left arm/leg to light touch. Otherwise normal sensation.     Coordination: Mild LUE dysmetria    Gait/Stance: Posture is normal. Gait is unsteady with shortened steps on a narrow base.       ASSESSMENT/PLAN:     Diagnosis/Etiology: Posterior circulation ischemic stroke 2/2 bilateral vertebral artery dissection. Likely from self cervical manipulation  Stroke Risk Factors: HLD  Effects of Stroke: Dysarthria, gait instability, sensory deficits, diplopia    Recommendations:   - Additional studies: No additional studies are indicated at this time.  - Antiplatelet/Anticoagulation: Continue ASA 81mg daily.   - Lipid Management: Target is LDL < 70mg/dL. Continue atorvastatin 40mg  daily.   - Diabetes: Target hemoglobin A1c <7%, measured 2X/year or quarterly if not meeting goals.  - Hypertension: Target systolic BP <130mmHg. At goal today.   - Sleep: No complaints since returning home from [facility]. Recommend continuing melatonin PRN for sleep.   - Weight: Target BMI is <25kg/m2 if BMI is > 25-27 kg/m2; if BMI >27kg/m2 10% weight loss is suggested over next 6 months. Dietary consult is suggested to assist in weight control.  - Therapy: Continue PT/OT/SLP as needed.  - Follow-up: RTC in 3-6 months. Recommend close follow-up with their PCP for monitoring and management of the above vascular risk factors as needed to meet goals.     # Pseudobulbar affect  # Anxiety  # Depression  - She is on multiple serotonergic agents since being discharged from [facility]. She continues to express uncontrolled crying, but states that this has significantly improved since she was admitted. She has done well on fluoxetine in the past and has been off of elavil and buspirone for the past couple of days. Recommend continuing nuedexta and fluoxetine at this time. Taken hydroxyzine PRN for acute anxiety. She may be able to come off of nuedexta as her mood stabilizes on fluoxetine. Recommend follow-up with a behavioral health provider and/or her PCP to monitor her medications.       Problem List Items Addressed This Visit          Neuro    Vertebral artery dissection       Ophtho    Diplopia - Primary         Aileen Deluca MD, PhD  [facility]  Department of Vascular Neurology

## 2023-02-04 ENCOUNTER — PATIENT MESSAGE (OUTPATIENT)
Dept: NEUROLOGY | Facility: CLINIC | Age: 38
End: 2023-02-04
Payer: COMMERCIAL

## 2023-02-10 ENCOUNTER — HOME CARE VISIT (OUTPATIENT)
Dept: NEUROLOGY | Facility: HOSPITAL | Age: 38
End: 2023-02-10
Payer: COMMERCIAL

## 2023-02-14 PROBLEM — Z23 NEED FOR VACCINATION AGAINST STREPTOCOCCUS PNEUMONIAE: Status: ACTIVE | Noted: 2023-02-14

## 2023-02-14 PROBLEM — F41.9 ANXIETY AND DEPRESSION: Status: ACTIVE | Noted: 2023-02-14

## 2023-02-14 PROBLEM — E66.09 CLASS 2 OBESITY DUE TO EXCESS CALORIES WITH BODY MASS INDEX (BMI) OF 37.0 TO 37.9 IN ADULT: Status: RESOLVED | Noted: 2022-12-25 | Resolved: 2023-02-14

## 2023-02-14 PROBLEM — F32.A ANXIETY AND DEPRESSION: Status: ACTIVE | Noted: 2023-02-14

## 2023-02-14 PROBLEM — E78.2 MIXED HYPERLIPIDEMIA: Status: ACTIVE | Noted: 2023-02-14

## 2023-02-14 PROBLEM — E66.812 CLASS 2 OBESITY DUE TO EXCESS CALORIES WITH BODY MASS INDEX (BMI) OF 37.0 TO 37.9 IN ADULT: Status: RESOLVED | Noted: 2022-12-25 | Resolved: 2023-02-14

## 2023-02-16 ENCOUNTER — PATIENT MESSAGE (OUTPATIENT)
Dept: NEUROLOGY | Facility: CLINIC | Age: 38
End: 2023-02-16
Payer: COMMERCIAL

## 2023-02-16 VITALS
HEART RATE: 78 BPM | DIASTOLIC BLOOD PRESSURE: 74 MMHG | RESPIRATION RATE: 20 BRPM | OXYGEN SATURATION: 99 % | SYSTOLIC BLOOD PRESSURE: 124 MMHG

## 2023-02-16 NOTE — PROGRESS NOTES
doing well at home with her , very pleasant woman with moderate physical limitations with her right side and with moderate aphasia but can communicate. Strong family support of her  who states patient is doing much better overall since being home. Continued progress overall with outpatient therapies, does have emotional times but these are getting better and fewer. No complaints of headaches, dizziness or nausea. States eating/swallowing ok and sleeping ok

## 2023-02-27 ENCOUNTER — PATIENT MESSAGE (OUTPATIENT)
Dept: NEUROLOGY | Facility: CLINIC | Age: 38
End: 2023-02-27
Payer: COMMERCIAL

## 2023-02-28 ENCOUNTER — PATIENT MESSAGE (OUTPATIENT)
Dept: NEUROLOGY | Facility: CLINIC | Age: 38
End: 2023-02-28
Payer: COMMERCIAL

## 2023-03-08 ENCOUNTER — PATIENT MESSAGE (OUTPATIENT)
Dept: NEUROLOGY | Facility: CLINIC | Age: 38
End: 2023-03-08
Payer: COMMERCIAL

## 2023-03-09 ENCOUNTER — PATIENT OUTREACH (OUTPATIENT)
Dept: ADMINISTRATIVE | Facility: OTHER | Age: 38
End: 2023-03-09
Payer: COMMERCIAL

## 2023-03-09 NOTE — PROGRESS NOTES
CHW - Case Closure    This Community Health Worker spoke to patient via telephone today.   Pt/Caregiver reported: patient declined  Pt/Caregiver denied any additional needs at this time and agrees with episode closure at this time.  Provided patient with Community Health Worker's contact information and encouraged him/her to contact this Community Health Worker if additional needs arise.

## 2023-03-10 ENCOUNTER — HOME CARE VISIT (OUTPATIENT)
Dept: NEUROLOGY | Facility: HOSPITAL | Age: 38
End: 2023-03-10
Payer: COMMERCIAL

## 2023-03-13 PROBLEM — R53.1 WEAKNESS: Status: ACTIVE | Noted: 2023-03-13

## 2023-03-14 VITALS
HEART RATE: 78 BPM | RESPIRATION RATE: 20 BRPM | SYSTOLIC BLOOD PRESSURE: 118 MMHG | DIASTOLIC BLOOD PRESSURE: 74 MMHG | OXYGEN SATURATION: 99 %

## 2023-03-14 NOTE — PROGRESS NOTES
Doing well today with continued progress, ambulating without any assistive devices, speech is improving. States may be returning back to work on the 20th hoping to go back part time to start off.

## 2023-03-22 PROBLEM — R27.8 COORDINATION IMPAIRMENT: Status: ACTIVE | Noted: 2023-03-22

## 2023-03-28 ENCOUNTER — PATIENT MESSAGE (OUTPATIENT)
Dept: NEUROLOGY | Facility: CLINIC | Age: 38
End: 2023-03-28

## 2023-03-28 ENCOUNTER — OFFICE VISIT (OUTPATIENT)
Dept: NEUROLOGY | Facility: CLINIC | Age: 38
End: 2023-03-28
Payer: COMMERCIAL

## 2023-03-28 DIAGNOSIS — E66.01 MORBID OBESITY: ICD-10-CM

## 2023-03-28 DIAGNOSIS — Z86.73 HISTORY OF STROKE: Primary | ICD-10-CM

## 2023-03-28 PROCEDURE — 99214 OFFICE O/P EST MOD 30 MIN: CPT | Mod: S$GLB,,, | Performed by: STUDENT IN AN ORGANIZED HEALTH CARE EDUCATION/TRAINING PROGRAM

## 2023-03-28 PROCEDURE — 99214 PR OFFICE/OUTPT VISIT, EST, LEVL IV, 30-39 MIN: ICD-10-PCS | Mod: S$GLB,,, | Performed by: STUDENT IN AN ORGANIZED HEALTH CARE EDUCATION/TRAINING PROGRAM

## 2023-03-28 NOTE — PROGRESS NOTES
Vascular Neurology Clinic  Return Clinic Visit    Patient Name: Madeline Carlton Ma  MRN: [id number]  Date: 3/28/23  Referring Provider: No ref. provider found    CC: Ischemic stroke    SUBJECTIVE:      History of Present Illness: Madeline Carlton Ma is a 37 y.o. female with a past medical history significant for migraine headaches who presents to clinic for follow-up for a recent admission for stroke.     She initially presented to [facility] with slurred speech, fatigue,bilateral ptosis, tongue numbness after doing a cervical manipulation to her neck at home. She presented within the therapeutic window for IV thrombolysis; however, given abnormal findings on CTH (with concern for possible intracranial mass), thrombolysis was deferred. [facility] did not have capability for CTA, so she was transferred to [facility] for additional work-up. CTA head/neck showed bilateral vertebral artery dissections. MRI brain revealed small foci of restricted diffusion in the bilateral cerebellar hemispheres and the right occipital lobe. Of note, two weeks prior to her presentation, she had headache with aura. CTH showed a hypodensity in the left cerebellum, which likely represented infarction.     She was admitted to the VN service from 12/25-12/30/22. Etiology of her strokes were secondary to vertebral artery dissections from high-velocity self neck adjustment. Her symptoms improved over the course of the admission and she was discharged to [facility] on ASA 81mg and atorvastatin. Pseudobulbar affect was noted.      We reviewed medications from her [facility] today. There is concern for multiple serotoninergic agents, which have been used to treat her pseudobulbar affect and anxiety/depression, including elavil, fluoxetine, and buspirone. She is also on nuedexta. Elavil and melatonin were previously used to facilitate sleep. She has been without elavil since she has been home and reports no issues with sleep. Nuedexta appeared to help  with her mood; however, she had started fluoxetine 2 weeks prior to starting this medication. She has also been without buspirone for the past couple of days since she has been out of IPR and feels that her anxiety overall has been stable. ROS + mild diplopia and dysarthria. No dysphagia. No recent falls. She appears tearful when discussing her stroke care in clinic today, but reports that overall she has been feeling better. She has adjusted her neck a few times since she was admitted due to habit. Her  has been monitoring this.     Interval History:   Since she was last seen in clinic, she has been doing well. She continues to participate in PT and speech therapy. She walks without assistive devices. No recent falls. She is currently on ASA, atorvastatin for secondary stroke prevention. Currently on prozac for depression and anxiety.       Etiology: Posterior circulation strokes 2/2 bilateral vertebral artery dissections from high-velocity self neck manipulations  Intervention: None    Work-up:    Imaging:  - [facility] (12/21/2022): Wedge-shaped 2.2 x 1.0 by 0.8 cm focus in the left cerebellum laterally with 2nd similar focus 0.5 cm similar focus ventral to it both questionable minimal patchy enhancement versus just a blood vessels passing through/around them. These foci are of unknown etiology, but differential considerations include encephalomalacia from chronic infarctions with the without some patchy enhancement from luxury perfusion, sequela from remote trauma, and benign and malignant masses. Recommend an MRI of the brain with and without contrast for further evaluation.    - CTA head and neck (12/25/2022):  1.  Abnormal segment of focal caliber change with luminal narrowing and irregularity involving the V2 segment of the left vertebral artery.  Additionally, there is focal luminal narrowing and irregularity involving the V3 segment of the right vertebral artery.  Findings are concerning for possible  bilateral vertebral artery dissection.  Vascular neurology consultation advised.  Further assessment could be performed with conventional angiography as warranted.  2.  Persistent asymmetric wedge-shaped region of parenchymal hypoattenuation within the left cerebellar hemisphere.  There is apparent contrast enhancement on the delayed series.  This is nonspecific although may relate to subacute infarct.  More sensitive assessment for ischemic could be performed with MRI if there are no patient contraindications.   3.  No evidence of cervical spine fracture.  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     - MRI brain without contrast (12/25/2022):  1.  Several apparent small subtle foci of restricted diffusion within the bilateral cerebellar hemispheres as well as additional punctate focus within the paramedian posterior right occipital lobe suspicious for punctate acute infarcts.  There is subtle asymmetric diffusion hyperintensity within the left lara lynn and additional region of acute ischemia not excluded.  Further evaluation and follow-up as warranted.  2.  Apparent small region of encephalomalacia within the left cerebellar hemisphere which may represent a remote infarct.  Additional possible punctate lacunar type infarct within the right superior cerebellar hemisphere.  3.  Abnormal T2 flow void involving the V3 segment of the right vertebral artery in this patient with suspected vertebral artery dissection.  4.  Several scattered foci of periventricular T2/FLAIR hyperintensity.  These lesions are nonspecific in appearance and may be seen with accelerated small vessel ischemic disease, vasculopathies, migraine headaches, and as the residua from inflammatory and traumatic insults to the brain.      Cardiac Evaluation:  - TTE (12/25/2022)  The left ventricle is normal in size with normal systolic function.  The estimated ejection fraction is 65%.  Normal left ventricular diastolic  function.  Normal right ventricular size with normal right ventricular systolic function.  Normal central venous pressure (3 mmHg).  The estimated PA systolic pressure is 27 mmHg.    - Cardiac monitor: No atrial arrhythmias identified when monitored in the inpatient setting    Labs:  Recent Labs   Lab 22  0346   Hemoglobin A1C 4.8   LDL Cholesterol 109.4   HDL 56   Triglycerides 68   Cholesterol 179         Review of Systems: The following systems were reviewed with pertinent positives and negatives documented in the HPI: constitutional, eyes, CV, respiratory, GI, , musculoskeletal, skin, neurological, psychiatric    Past Medical History:  Past Medical History:   Diagnosis Date    Migraine headache     Ptosis, bilateral 2022       Medications:    Current Outpatient Medications:     aspirin (ECOTRIN) 81 MG EC tablet, Take 1 tablet (81 mg total) by mouth once daily., Disp: 360 tablet, Rfl: 0    atorvastatin (LIPITOR) 40 MG tablet, Take 1 tablet (40 mg total) by mouth once daily., Disp: 90 tablet, Rfl: 3    dextromethorphan-quinidine 20-10 mg (NUEDEXTA) 20-10 mg per capsule, Take 1 capsule by mouth every 12 (twelve) hours., Disp: , Rfl:     FLUoxetine 10 MG Tab, Take 1 tablet (10 mg total) by mouth once daily., Disp: 90 tablet, Rfl: 3  Any other notable medications as documented in HPI.    Allergies:  Review of patient's allergies indicates:   Allergen Reactions    Promethazine Other (See Comments)     Nervous reaction       Social History:  Social History     Socioeconomic History    Marital status:    Tobacco Use    Smoking status: Former     Types: Cigarettes     Quit date: 2013     Years since quittin.6    Smokeless tobacco: Never   Substance and Sexual Activity    Alcohol use: Yes     Alcohol/week: 6.0 standard drinks     Types: 6 Glasses of wine per week     Comment: 2 time per week    Drug use: No    Sexual activity: Yes     Partners: Male     Birth control/protection: See Surgical  Hx     Comment: Tubal removal 2017     Social Determinants of Health     Financial Resource Strain: Low Risk     Difficulty of Paying Living Expenses: Not hard at all   Food Insecurity: No Food Insecurity    Worried About Running Out of Food in the Last Year: Never true    Ran Out of Food in the Last Year: Never true   Transportation Needs: No Transportation Needs    Lack of Transportation (Medical): No    Lack of Transportation (Non-Medical): No   Physical Activity: Unknown    Days of Exercise per Week: Patient refused    Minutes of Exercise per Session: 20 min   Stress: No Stress Concern Present    Feeling of Stress : Not at all   Social Connections: Unknown    Frequency of Communication with Friends and Family: More than three times a week    Frequency of Social Gatherings with Friends and Family: Once a week    Active Member of Clubs or Organizations: Patient refused    Attends Club or Organization Meetings: Patient refused    Marital Status:    Housing Stability: Low Risk     Unable to Pay for Housing in the Last Year: No    Number of Places Lived in the Last Year: 1    Unstable Housing in the Last Year: No     Any other notable Social History as documented in HPI.    Family History:  Family History   Problem Relation Age of Onset    Ovarian cancer Mother 34    Hyperlipidemia Father     Ovarian cancer Maternal Aunt         age unknown    Breast cancer Paternal Grandmother         50's     Any other notable FMH as documented in HPI.      OBJECTIVE:     Physical Exam:   There were no vitals filed for this visit.    GEN: NAD, pleasant, cooperative  CV: RRR  PULM: No signs of resp distress, on room air  EXT: No cyanosis, clubbing, or edema.    NEURO:  Mental status: The patient is alert, attentive, and oriented to self, age, month, year, situation. Pseudobulbar affect - becomes tearful when discussing stroke care/medications.   Speech: Slow prosody and forced intonation with intact repetition, comprehension,  and naming. Speech is dysarthric, but intelligible.     Cranial nerves:  CN II: Visual fields are full to confrontation. Pupils are 3mm and reactive to light OU.  CN III, IV, VI: EOMI OS, no nystagmus, no ptosis, subtle right cranial nerve 6 palsy  CN V: Facial sensation is intact in all 3 divisions bilaterally.  CN VII: Face is symmetric with normal eye closure and smile.  CN VIII: Hearing is normal bilaterally.  CN IX, X: Palate elevates symmetrically.   CN XI: Head turning and shoulder shrug are intact.  CN XII: Tongue is midline with normal movements and no atrophy.    Motor: Muscle bulk and tone are normal. No pronator drift. 4+/5 hip flexion bilaterally, otherwise 5/5 strength throughout    Reflexes: Reflexes are 2+ and symmetric at the biceps, triceps, knees.    Sensory: Intact sensation to light touch in her bilateral upper and lower extremities.     Coordination: Rapid alternating movements and fine finger movements are intact. There is no dysmetria on finger-to-nose and heel-knee-shin. There are no abnormal or extraneous movements.    Gait/Stance: Posture is normal. Gait is unsteady with shortened steps on a narrow base.       ASSESSMENT/PLAN:     Diagnosis/Etiology: Posterior circulation ischemic stroke 2/2 bilateral vertebral artery dissection. Likely from self cervical manipulation  Stroke Risk Factors: HLD  Effects of Stroke: Dysarthria    Recommendations:   - Additional studies: No additional studies are indicated at this time.  - Antiplatelet/Anticoagulation: Continue ASA 81mg daily.   - Lipid Management: Target is LDL < 70mg/dL. Continue atorvastatin 40mg daily.   - Diabetes: Target hemoglobin A1c <7%, measured 2X/year or quarterly if not meeting goals.  - Hypertension: Target systolic BP <130mmHg. At goal today.   - Sleep: No complaints today. Recommend continuing melatonin PRN for sleep.   - Weight: Target BMI is <25kg/m2 if BMI is > 25-27 kg/m2; if BMI >27kg/m2 10% weight loss is suggested over  next 6 months. Dietary consult is suggested to assist in weight control.  - Therapy: Continue PT/OT/SLP as needed.  - Follow-up: RTC in 6 months. Recommend close follow-up with their PCP for monitoring and management of the above vascular risk factors as needed to meet goals.     # Pseudobulbar affect  # Anxiety  # Depression  - Continue prozac and follow-up with her PCP or behavioral health provider      Problem List Items Addressed This Visit    None  Visit Diagnoses       History of stroke    -  Primary                Aileen Deluca MD, PhD  [facility]  Department of Vascular Neurology

## 2023-03-28 NOTE — LETTER
March 28, 2023                     [facility] - Neurology 8th Fl  [address]  [facility] [address]  Phone: [phone]  Fax: [phone]   Patient: Madeline Carlton Ma   MR Number: [id number]   YOB: 1985   Date of Visit: 3/28/2023     To whom it may concern:    Madeline Angeles is a patient under my care at [facility], Vascular Neurology division. She is seen for an ischemic stroke that occurred on December 24, 2022. She has recovered well and is cleared to undergo routine dental procedures at this time. Don't hesitate to contact my office with any questions or concerns.         Sincerely,      Aileen Deluca MD, PhD                Enclosure

## 2023-04-10 ENCOUNTER — PATIENT MESSAGE (OUTPATIENT)
Dept: NEUROLOGY | Facility: CLINIC | Age: 38
End: 2023-04-10
Payer: COMMERCIAL

## 2023-04-12 ENCOUNTER — HOME CARE VISIT (OUTPATIENT)
Dept: NEUROLOGY | Facility: HOSPITAL | Age: 38
End: 2023-04-12
Payer: COMMERCIAL

## 2023-04-12 VITALS
HEART RATE: 68 BPM | SYSTOLIC BLOOD PRESSURE: 112 MMHG | OXYGEN SATURATION: 99 % | DIASTOLIC BLOOD PRESSURE: 78 MMHG | RESPIRATION RATE: 20 BRPM

## 2023-04-12 NOTE — PROGRESS NOTES
No significant changes since previous  visit, patient returned back to work working 20 week and doing fine with no issues, continues with outpatient therapy and performs all ADL's independently, has no returned back to driving as of yet.

## 2023-06-01 ENCOUNTER — HOME CARE VISIT (OUTPATIENT)
Dept: NEUROLOGY | Facility: HOSPITAL | Age: 38
End: 2023-06-01
Payer: COMMERCIAL

## 2023-06-01 VITALS
OXYGEN SATURATION: 98 % | RESPIRATION RATE: 20 BRPM | DIASTOLIC BLOOD PRESSURE: 78 MMHG | HEART RATE: 80 BPM | SYSTOLIC BLOOD PRESSURE: 118 MMHG

## 2023-06-01 NOTE — PROGRESS NOTES
Doing well today, continues to work part time from home with no difficulty. Attends weekly therapy and progressing well. No complaints of headaches, dizziness or nausea. Does not drive yet but states she will start practicing.

## 2023-08-30 ENCOUNTER — PATIENT MESSAGE (OUTPATIENT)
Dept: NEUROLOGY | Facility: CLINIC | Age: 38
End: 2023-08-30
Payer: COMMERCIAL

## 2023-09-25 ENCOUNTER — PATIENT MESSAGE (OUTPATIENT)
Dept: NEUROLOGY | Facility: CLINIC | Age: 38
End: 2023-09-25

## 2023-09-25 ENCOUNTER — OFFICE VISIT (OUTPATIENT)
Dept: NEUROLOGY | Facility: CLINIC | Age: 38
End: 2023-09-25
Payer: COMMERCIAL

## 2023-09-25 VITALS — SYSTOLIC BLOOD PRESSURE: 122 MMHG | HEART RATE: 58 BPM | DIASTOLIC BLOOD PRESSURE: 87 MMHG

## 2023-09-25 DIAGNOSIS — F41.9 ANXIETY AND DEPRESSION: ICD-10-CM

## 2023-09-25 DIAGNOSIS — Z86.73 HISTORY OF STROKE: Primary | ICD-10-CM

## 2023-09-25 DIAGNOSIS — F32.A ANXIETY AND DEPRESSION: ICD-10-CM

## 2023-09-25 PROCEDURE — 3079F PR MOST RECENT DIASTOLIC BLOOD PRESSURE 80-89 MM HG: ICD-10-PCS | Mod: CPTII,S$GLB,, | Performed by: STUDENT IN AN ORGANIZED HEALTH CARE EDUCATION/TRAINING PROGRAM

## 2023-09-25 PROCEDURE — 99215 PR OFFICE/OUTPT VISIT, EST, LEVL V, 40-54 MIN: ICD-10-PCS | Mod: S$GLB,,, | Performed by: STUDENT IN AN ORGANIZED HEALTH CARE EDUCATION/TRAINING PROGRAM

## 2023-09-25 PROCEDURE — 99999 PR PBB SHADOW E&M-EST. PATIENT-LVL II: CPT | Mod: PBBFAC,,, | Performed by: STUDENT IN AN ORGANIZED HEALTH CARE EDUCATION/TRAINING PROGRAM

## 2023-09-25 PROCEDURE — 99215 OFFICE O/P EST HI 40 MIN: CPT | Mod: S$GLB,,, | Performed by: STUDENT IN AN ORGANIZED HEALTH CARE EDUCATION/TRAINING PROGRAM

## 2023-09-25 PROCEDURE — 1159F PR MEDICATION LIST DOCUMENTED IN MEDICAL RECORD: ICD-10-PCS | Mod: CPTII,S$GLB,, | Performed by: STUDENT IN AN ORGANIZED HEALTH CARE EDUCATION/TRAINING PROGRAM

## 2023-09-25 PROCEDURE — 1159F MED LIST DOCD IN RCRD: CPT | Mod: CPTII,S$GLB,, | Performed by: STUDENT IN AN ORGANIZED HEALTH CARE EDUCATION/TRAINING PROGRAM

## 2023-09-25 PROCEDURE — 99999 PR PBB SHADOW E&M-EST. PATIENT-LVL II: ICD-10-PCS | Mod: PBBFAC,,, | Performed by: STUDENT IN AN ORGANIZED HEALTH CARE EDUCATION/TRAINING PROGRAM

## 2023-09-25 PROCEDURE — 3074F PR MOST RECENT SYSTOLIC BLOOD PRESSURE < 130 MM HG: ICD-10-PCS | Mod: CPTII,S$GLB,, | Performed by: STUDENT IN AN ORGANIZED HEALTH CARE EDUCATION/TRAINING PROGRAM

## 2023-09-25 PROCEDURE — 3079F DIAST BP 80-89 MM HG: CPT | Mod: CPTII,S$GLB,, | Performed by: STUDENT IN AN ORGANIZED HEALTH CARE EDUCATION/TRAINING PROGRAM

## 2023-09-25 PROCEDURE — 3074F SYST BP LT 130 MM HG: CPT | Mod: CPTII,S$GLB,, | Performed by: STUDENT IN AN ORGANIZED HEALTH CARE EDUCATION/TRAINING PROGRAM

## 2023-09-25 NOTE — PROGRESS NOTES
Vascular Neurology Clinic  Return Clinic Visit    Patient Name: Madeline Carlton Ma  MRN: [id number]  Date: 9/25/23  Referring Provider: No ref. provider found    CC: Ischemic stroke    SUBJECTIVE:      History of Present Illness: Madeline Carlton Ma is a 38 y.o. female with a past medical history significant for migraine headaches who presents to clinic for follow-up for stroke.     She initially presented to [facility] with slurred speech, fatigue, bilateral ptosis, and tongue numbness after doing a cervical manipulation to her neck at home. She presented within the therapeutic window for IV thrombolysis; however, given abnormal findings on CTH (with concern for possible intracranial mass), thrombolysis was deferred. [facility] did not have capability for CTA, so she was transferred to [facility] for additional work-up. CTA head/neck showed bilateral vertebral artery dissections. MRI brain revealed small foci of restricted diffusion in the bilateral cerebellar hemispheres and the right occipital lobe. Of note, two weeks prior to her presentation, she had headache with aura. CTH at that time showed a hypodensity in the left cerebellum, which likely represented infarction.     She was admitted to the VN service from 12/25-12/30/22. Etiology of her strokes were secondary to vertebral artery dissections from high-velocity self neck adjustment. Her symptoms improved over the course of the admission and she was discharged to [facility] on ASA 81mg and atorvastatin. Pseudobulbar affect was noted.      Post-hospital course was c/b pseudobulbar affect and anxiety/depression, now stable on fluoxetine. S/p outpatient PT/OT/SLP.     Interval History:   Since she was last seen in clinic, she has been doing well. She has completed PT/OT/SLP. She walks without assistive devices. No recent falls. She is currently on ASA, atorvastatin for secondary stroke prevention. Currently on prozac for depression and anxiety. Continues to  experience fatigue, decreased energy. She works as a  part-time. She is also continuing home physical therapy.       Etiology: Posterior circulation strokes 2/2 bilateral vertebral artery dissections from high-velocity self neck manipulations  Intervention: None    Work-up:    Imaging:  - [facility] (12/21/2022): Wedge-shaped 2.2 x 1.0 by 0.8 cm focus in the left cerebellum laterally with 2nd similar focus 0.5 cm similar focus ventral to it both questionable minimal patchy enhancement versus just a blood vessels passing through/around them. These foci are of unknown etiology, but differential considerations include encephalomalacia from chronic infarctions with the without some patchy enhancement from luxury perfusion, sequela from remote trauma, and benign and malignant masses. Recommend an MRI of the brain with and without contrast for further evaluation.    - CTA head and neck (12/25/2022):  1.  Abnormal segment of focal caliber change with luminal narrowing and irregularity involving the V2 segment of the left vertebral artery.  Additionally, there is focal luminal narrowing and irregularity involving the V3 segment of the right vertebral artery.  Findings are concerning for possible bilateral vertebral artery dissection.  Vascular neurology consultation advised.  Further assessment could be performed with conventional angiography as warranted.  2.  Persistent asymmetric wedge-shaped region of parenchymal hypoattenuation within the left cerebellar hemisphere.  There is apparent contrast enhancement on the delayed series.  This is nonspecific although may relate to subacute infarct.  More sensitive assessment for ischemic could be performed with MRI if there are no patient contraindications.   3.  No evidence of cervical spine fracture.  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     - MRI brain without contrast (12/25/2022):  1.  Several apparent small subtle foci of restricted  diffusion within the bilateral cerebellar hemispheres as well as additional punctate focus within the paramedian posterior right occipital lobe suspicious for punctate acute infarcts.  There is subtle asymmetric diffusion hyperintensity within the left lara lynn and additional region of acute ischemia not excluded.  Further evaluation and follow-up as warranted.  2.  Apparent small region of encephalomalacia within the left cerebellar hemisphere which may represent a remote infarct.  Additional possible punctate lacunar type infarct within the right superior cerebellar hemisphere.  3.  Abnormal T2 flow void involving the V3 segment of the right vertebral artery in this patient with suspected vertebral artery dissection.  4.  Several scattered foci of periventricular T2/FLAIR hyperintensity.  These lesions are nonspecific in appearance and may be seen with accelerated small vessel ischemic disease, vasculopathies, migraine headaches, and as the residua from inflammatory and traumatic insults to the brain.      Cardiac Evaluation:  - TTE (12/25/2022)  The left ventricle is normal in size with normal systolic function.  The estimated ejection fraction is 65%.  Normal left ventricular diastolic function.  Normal right ventricular size with normal right ventricular systolic function.  Normal central venous pressure (3 mmHg).  The estimated PA systolic pressure is 27 mmHg.    - Cardiac monitor: No atrial arrhythmias identified when monitored in the inpatient setting    Labs:  Recent Labs   Lab 12/25/22  0346   Hemoglobin A1C 4.8   LDL Cholesterol 109.4   HDL 56   Triglycerides 68   Cholesterol 179         Review of Systems: The following systems were reviewed with pertinent positives and negatives documented in the HPI: constitutional, eyes, CV, respiratory, GI, , musculoskeletal, skin, neurological, psychiatric    Past Medical History:  Past Medical History:   Diagnosis Date    Migraine headache     Ptosis, bilateral  12/24/2022       Medications:    Current Outpatient Medications:     aspirin (ECOTRIN) 81 MG EC tablet, Take 1 tablet (81 mg total) by mouth once daily., Disp: 360 tablet, Rfl: 0    atorvastatin (LIPITOR) 40 MG tablet, Take 1 tablet (40 mg total) by mouth once daily., Disp: 90 tablet, Rfl: 1    FLUoxetine 10 MG Tab, Take 1 tablet (10 mg total) by mouth once daily., Disp: 90 tablet, Rfl: 1  Any other notable medications as documented in HPI.    Allergies:  Review of patient's allergies indicates:   Allergen Reactions    Promethazine Other (See Comments)     Nervous reaction       Social History:  Social History     Socioeconomic History    Marital status:    Tobacco Use    Smoking status: Former     Current packs/day: 0.00     Types: Cigarettes     Quit date: 8/1/2013     Years since quitting: 10.1    Smokeless tobacco: Never   Substance and Sexual Activity    Alcohol use: Yes     Alcohol/week: 6.0 standard drinks of alcohol     Types: 6 Glasses of wine per week     Comment: 2 time per week    Drug use: No    Sexual activity: Yes     Partners: Male     Birth control/protection: See Surgical Hx     Comment: Tubal removal 2017     Social Determinants of Health     Financial Resource Strain: Low Risk  (2/12/2023)    Overall Financial Resource Strain (CARDIA)     Difficulty of Paying Living Expenses: Not hard at all   Food Insecurity: No Food Insecurity (2/12/2023)    Hunger Vital Sign     Worried About Running Out of Food in the Last Year: Never true     Ran Out of Food in the Last Year: Never true   Transportation Needs: No Transportation Needs (2/12/2023)    PRAPARE - Transportation     Lack of Transportation (Medical): No     Lack of Transportation (Non-Medical): No   Physical Activity: Unknown (2/12/2023)    Exercise Vital Sign     Days of Exercise per Week: Patient refused     Minutes of Exercise per Session: 20 min   Recent Concern: Physical Activity - Insufficiently Active (12/20/2022)    Exercise Vital  Sign     Days of Exercise per Week: 6 days     Minutes of Exercise per Session: 20 min   Stress: No Stress Concern Present (2/12/2023)    [employer] [place] [employer] - Occupational Stress Questionnaire     Feeling of Stress : Not at all   Social Connections: Unknown (2/12/2023)    Social Connection and Isolation Panel [NHANES]     Frequency of Communication with Friends and Family: More than three times a week     Frequency of Social Gatherings with Friends and Family: Once a week     Active Member of Clubs or Organizations: Patient refused     Attends Club or Organization Meetings: Patient refused     Marital Status:    Housing Stability: Low Risk  (2/12/2023)    Housing Stability Vital Sign     Unable to Pay for Housing in the Last Year: No     Number of Places Lived in the Last Year: 1     Unstable Housing in the Last Year: No     Any other notable Social History as documented in HPI.    Family History:  Family History   Problem Relation Age of Onset    Ovarian cancer Mother 34    Hyperlipidemia Father     Ovarian cancer Maternal Aunt         age unknown    Breast cancer Paternal Grandmother         50's     Any other notable FMH as documented in HPI.      OBJECTIVE:     Physical Exam:   Vitals:    09/25/23 0821   BP: 122/87   Pulse: (!) 58       GEN: NAD, pleasant, cooperative  CV: RRR  PULM: No signs of resp distress, on room air  EXT: No cyanosis, clubbing, or edema.    NEURO:  Mental status: The patient is alert, attentive, and oriented to self, age, month, year, situation. Pseudobulbar affect - becomes tearful when discussing stroke care/medications.   Speech: Slow prosody and forced intonation with intact repetition, comprehension, and naming. Speech is dysarthric, but intelligible.     Cranial nerves:  CN II: Visual fields are full to confrontation. Pupils are 3mm and reactive to light OU.  CN III, IV, VI: EOMI OS, no nystagmus, no ptosis, subtle right cranial nerve 6 palsy  CN V:  Facial sensation is intact in all 3 divisions bilaterally.  CN VII: Face is symmetric with normal eye closure and smile.  CN VIII: Hearing is normal bilaterally.  CN IX, X: Palate elevates symmetrically.   CN XI: Head turning and shoulder shrug are intact.  CN XII: Tongue is midline with normal movements and no atrophy.    Motor: Muscle bulk and tone are normal. No pronator drift. 4+/5 hip flexion bilaterally, otherwise 5/5 strength throughout    Reflexes: Reflexes are 2+ and symmetric at the biceps, triceps, knees.    Sensory: Intact sensation to light touch in her bilateral upper and lower extremities.     Coordination: Rapid alternating movements and fine finger movements are intact. There is no dysmetria on finger-to-nose and heel-knee-shin. There are no abnormal or extraneous movements.    Gait/Stance: Posture is normal. Gait is unsteady with shortened steps on a narrow base.       ASSESSMENT/PLAN:     Diagnosis/Etiology: Posterior circulation ischemic stroke 2/2 bilateral vertebral artery dissection. Likely from self cervical manipulation.  Stroke Risk Factors: HLD  Effects of Stroke: Dysarthria    Recommendations:   - Additional studies: None  - Antiplatelet/Anticoagulation: Continue ASA 81mg daily.   - Lipid Management: Target is LDL < 70mg/dL. Continue atorvastatin 40mg daily.   - Diabetes: Target hemoglobin A1c <7%, measured 2X/year or quarterly if not meeting goals.  - Hypertension: Target systolic BP <130mmHg. At goal today.   - Sleep: No complaints today. Recommend continuing melatonin PRN for sleep.   - Weight: Target BMI is <25kg/m2 if BMI is > 25-27 kg/m2; if BMI >27kg/m2 10% weight loss is suggested over next 6 months. Dietary consult is suggested to assist in weight control.  - Therapy: No therapy needs at this time.   - Follow-up: RTC in 6 months. Recommend close follow-up with their PCP for monitoring and management of the above vascular risk factors as needed to meet goals.     # Pseudobulbar  affect  # Anxiety  # Depression  - Continue prozac and follow-up with her PCP or behavioral health provider      Problem List Items Addressed This Visit          Psychiatric    Anxiety and depression     Other Visit Diagnoses       History of stroke    -  Primary                  Aileen Deluca MD, PhD  [facility]  Department of Vascular Neurology      40 minutes of total time spent on the encounter, which includes face to face time and non-face to face time preparing to see the patient (eg, review of tests), obtaining and/or reviewing separately obtained history, documenting clinical information in the electronic or other health record, independently interpreting results (not separately reported), and communicating results to the patient/family/caregiver, patient/family education, and care coordination (not separately reported).

## 2023-11-17 ENCOUNTER — HOME CARE VISIT (OUTPATIENT)
Dept: NEUROLOGY | Facility: HOSPITAL | Age: 38
End: 2023-11-17
Payer: COMMERCIAL

## 2024-02-19 ENCOUNTER — TELEPHONE (OUTPATIENT)
Dept: NEUROLOGY | Facility: CLINIC | Age: 39
End: 2024-02-19
Payer: COMMERCIAL

## 2024-02-19 NOTE — TELEPHONE ENCOUNTER
----- Message from Leatha Owens sent at 2/19/2024 12:04 PM CST -----  Regarding: PT'S  IS REQUESTING A CALL BACK FOR A 6 MONTHS FOLLOW APPT  Contact: pt  Confirmed contact info below:  Contact Name: Madeline Ma  Phone Number: [phone]

## 2024-03-04 ENCOUNTER — OFFICE VISIT (OUTPATIENT)
Dept: NEUROLOGY | Facility: CLINIC | Age: 39
End: 2024-03-04
Payer: COMMERCIAL

## 2024-03-04 VITALS
WEIGHT: 187.63 LBS | BODY MASS INDEX: 35.43 KG/M2 | DIASTOLIC BLOOD PRESSURE: 86 MMHG | HEART RATE: 62 BPM | SYSTOLIC BLOOD PRESSURE: 126 MMHG | HEIGHT: 61 IN

## 2024-03-04 DIAGNOSIS — I77.74 VERTEBRAL ARTERY DISSECTION: ICD-10-CM

## 2024-03-04 DIAGNOSIS — F41.9 ANXIETY AND DEPRESSION: ICD-10-CM

## 2024-03-04 DIAGNOSIS — Z86.73 HISTORY OF STROKE: Primary | ICD-10-CM

## 2024-03-04 DIAGNOSIS — F32.A ANXIETY AND DEPRESSION: ICD-10-CM

## 2024-03-04 PROCEDURE — 3079F DIAST BP 80-89 MM HG: CPT | Mod: CPTII,S$GLB,, | Performed by: STUDENT IN AN ORGANIZED HEALTH CARE EDUCATION/TRAINING PROGRAM

## 2024-03-04 PROCEDURE — 99214 OFFICE O/P EST MOD 30 MIN: CPT | Mod: S$GLB,,, | Performed by: STUDENT IN AN ORGANIZED HEALTH CARE EDUCATION/TRAINING PROGRAM

## 2024-03-04 PROCEDURE — 3074F SYST BP LT 130 MM HG: CPT | Mod: CPTII,S$GLB,, | Performed by: STUDENT IN AN ORGANIZED HEALTH CARE EDUCATION/TRAINING PROGRAM

## 2024-03-04 PROCEDURE — 99999 PR PBB SHADOW E&M-EST. PATIENT-LVL III: CPT | Mod: PBBFAC,,, | Performed by: STUDENT IN AN ORGANIZED HEALTH CARE EDUCATION/TRAINING PROGRAM

## 2024-03-04 PROCEDURE — 3008F BODY MASS INDEX DOCD: CPT | Mod: CPTII,S$GLB,, | Performed by: STUDENT IN AN ORGANIZED HEALTH CARE EDUCATION/TRAINING PROGRAM

## 2024-03-04 PROCEDURE — 1159F MED LIST DOCD IN RCRD: CPT | Mod: CPTII,S$GLB,, | Performed by: STUDENT IN AN ORGANIZED HEALTH CARE EDUCATION/TRAINING PROGRAM

## 2024-03-04 NOTE — PROGRESS NOTES
Vascular Neurology Clinic  Return Clinic Visit    Patient Name: Madeline Carlton Ma  MRN: [id number]  Date: 3/4/24  Referring Provider: No ref. provider found    CC: Ischemic stroke    SUBJECTIVE:      History of Present Illness: Madeline Carlton Ma is a 38 y.o. female with a past medical history significant for migraine headaches who presents to clinic for follow-up for stroke.     She initially presented to [facility] with slurred speech, fatigue, bilateral ptosis, and tongue numbness after doing a cervical manipulation to her neck at home. She presented within the therapeutic window for IV thrombolysis; however, given abnormal findings on CTH (with concern for possible intracranial mass), thrombolysis was deferred. [facility] did not have capability for CTA, so she was transferred to [facility] for additional work-up. CTA head/neck showed bilateral vertebral artery dissections. MRI brain revealed small foci of restricted diffusion in the bilateral cerebellar hemispheres and the right occipital lobe. Of note, two weeks prior to her presentation, she had headache with aura. CTH at that time showed a hypodensity in the left cerebellum, which likely represented infarction.     She was admitted to the VN service from 12/25-12/30/22. Etiology of her strokes were secondary to vertebral artery dissections from high-velocity self neck adjustment. Her symptoms improved over the course of the admission and she was discharged to [facility] on ASA 81mg and atorvastatin. Pseudobulbar affect was noted.      Post-hospital course was c/b pseudobulbar affect and anxiety/depression, now stable on fluoxetine. S/p outpatient PT/OT/SLP.     Interval History:   Since she was last seen in clinic, she has been doing well. She has completed PT/OT/SLP. She walks without assistive devices. No recent falls. She is currently on ASA, atorvastatin for secondary stroke prevention. Currently on prozac 10mg daily for depression and anxiety. She  works as a  part-time, up to 30 hours a week, but experiences intermittent fatigue, cognitive fogging. She is also continuing home physical therapy. Walks on a treadmill up to 6 days per week.       Etiology: Posterior circulation strokes 2/2 bilateral vertebral artery dissections from high-velocity self neck manipulations  Intervention: None    Work-up:    Imaging:  - [facility] (12/21/2022): Wedge-shaped 2.2 x 1.0 by 0.8 cm focus in the left cerebellum laterally with 2nd similar focus 0.5 cm similar focus ventral to it both questionable minimal patchy enhancement versus just a blood vessels passing through/around them. These foci are of unknown etiology, but differential considerations include encephalomalacia from chronic infarctions with the without some patchy enhancement from luxury perfusion, sequela from remote trauma, and benign and malignant masses. Recommend an MRI of the brain with and without contrast for further evaluation.    - CTA head and neck (12/25/2022):  1.  Abnormal segment of focal caliber change with luminal narrowing and irregularity involving the V2 segment of the left vertebral artery.  Additionally, there is focal luminal narrowing and irregularity involving the V3 segment of the right vertebral artery.  Findings are concerning for possible bilateral vertebral artery dissection.  Vascular neurology consultation advised.  Further assessment could be performed with conventional angiography as warranted.  2.  Persistent asymmetric wedge-shaped region of parenchymal hypoattenuation within the left cerebellar hemisphere.  There is apparent contrast enhancement on the delayed series.  This is nonspecific although may relate to subacute infarct.  More sensitive assessment for ischemic could be performed with MRI if there are no patient contraindications.   3.  No evidence of cervical spine fracture.  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     - MRI brain  without contrast (12/25/2022):  1.  Several apparent small subtle foci of restricted diffusion within the bilateral cerebellar hemispheres as well as additional punctate focus within the paramedian posterior right occipital lobe suspicious for punctate acute infarcts.  There is subtle asymmetric diffusion hyperintensity within the left lara lynn and additional region of acute ischemia not excluded.  Further evaluation and follow-up as warranted.  2.  Apparent small region of encephalomalacia within the left cerebellar hemisphere which may represent a remote infarct.  Additional possible punctate lacunar type infarct within the right superior cerebellar hemisphere.  3.  Abnormal T2 flow void involving the V3 segment of the right vertebral artery in this patient with suspected vertebral artery dissection.  4.  Several scattered foci of periventricular T2/FLAIR hyperintensity.  These lesions are nonspecific in appearance and may be seen with accelerated small vessel ischemic disease, vasculopathies, migraine headaches, and as the residua from inflammatory and traumatic insults to the brain.      Cardiac Evaluation:  - TTE (12/25/2022)  The left ventricle is normal in size with normal systolic function.  The estimated ejection fraction is 65%.  Normal left ventricular diastolic function.  Normal right ventricular size with normal right ventricular systolic function.  Normal central venous pressure (3 mmHg).  The estimated PA systolic pressure is 27 mmHg.    - Cardiac monitor: No atrial arrhythmias identified when monitored in the inpatient setting    Labs:  Recent Labs   Lab 12/25/22  0346   Hemoglobin A1C 4.8   LDL Cholesterol 109.4   HDL 56   Triglycerides 68   Cholesterol 179         Review of Systems: The following systems were reviewed with pertinent positives and negatives documented in the HPI: constitutional, eyes, CV, respiratory, GI, , musculoskeletal, skin, neurological, psychiatric    Past Medical  History:  Past Medical History:   Diagnosis Date    Migraine headache     Ptosis, bilateral 12/24/2022       Medications:    Current Outpatient Medications:     aspirin (ECOTRIN) 81 MG EC tablet, Take 1 tablet (81 mg total) by mouth once daily., Disp: 360 tablet, Rfl: 0    atorvastatin (LIPITOR) 40 MG tablet, TAKE 1 TABLET BY MOUTH EVERY DAY, Disp: 90 tablet, Rfl: 0    FLUoxetine 10 MG Tab, TAKE 1 TABLET BY MOUTH EVERY DAY, Disp: 90 tablet, Rfl: 0  Any other notable medications as documented in HPI.    Allergies:  Review of patient's allergies indicates:   Allergen Reactions    Promethazine Other (See Comments)     Nervous reaction    Nickel sutures [surgical stainless steel] Rash       Social History:  Social History     Socioeconomic History    Marital status:    Tobacco Use    Smoking status: Former     Current packs/day: 0.00     Types: Cigarettes     Quit date: 8/1/2013     Years since quitting: 10.5    Smokeless tobacco: Never   Substance and Sexual Activity    Alcohol use: Yes     Alcohol/week: 6.0 standard drinks of alcohol     Types: 6 Glasses of wine per week     Comment: 2 time per week    Drug use: No    Sexual activity: Yes     Partners: Male     Birth control/protection: See Surgical Hx     Comment: Tubal removal 2017     Social Determinants of Health     Financial Resource Strain: Low Risk  (2/12/2023)    Overall Financial Resource Strain (CARDIA)     Difficulty of Paying Living Expenses: Not hard at all   Food Insecurity: No Food Insecurity (2/12/2023)    Hunger Vital Sign     Worried About Running Out of Food in the Last Year: Never true     Ran Out of Food in the Last Year: Never true   Transportation Needs: No Transportation Needs (2/12/2023)    PRAPARE - Transportation     Lack of Transportation (Medical): No     Lack of Transportation (Non-Medical): No   Physical Activity: Unknown (2/12/2023)    Exercise Vital Sign     Days of Exercise per Week: Patient declined   Recent Concern: Physical  Activity - Insufficiently Active (12/20/2022)    Exercise Vital Sign     Days of Exercise per Week: 6 days     Minutes of Exercise per Session: 20 min   Stress: No Stress Concern Present (2/12/2023)    [employer] - Occupational Stress Questionnaire     Feeling of Stress : Not at all   Social Connections: Unknown (2/12/2023)    Social Connection and Isolation Panel [NHANES]     Frequency of Communication with Friends and Family: More than three times a week     Frequency of Social Gatherings with Friends and Family: Once a week     Active Member of Clubs or Organizations: Patient declined     Marital Status:    Housing Stability: Low Risk  (2/12/2023)    Housing Stability Vital Sign     Unable to Pay for Housing in the Last Year: No     Number of Places Lived in the Last Year: 1     Unstable Housing in the Last Year: No     Any other notable Social History as documented in HPI.    Family History:  Family History   Problem Relation Age of Onset    Ovarian cancer Mother 34    Hyperlipidemia Father     Ovarian cancer Maternal Aunt         age unknown    Breast cancer Paternal Grandmother         50's     Any other notable FMH as documented in HPI.      OBJECTIVE:     Physical Exam:   Vitals:    03/04/24 0859   BP: 126/86   Pulse: 62       GEN: NAD, pleasant, cooperative  CV: RRR  PULM: No signs of resp distress, on room air  EXT: No cyanosis, clubbing, or edema.    NEURO:  Mental status: The patient is alert, attentive, and oriented to self, age, month, year, situation. Pseudobulbar affect - becomes tearful when discussing stroke care/medications.   Speech: Slow prosody and forced intonation with intact repetition, comprehension, and naming. Speech is dysarthric, but intelligible.     Cranial nerves:  CN II: Visual fields are full to confrontation. Pupils are 3mm and reactive to light OU.  CN III, IV, VI: EOMI OS, no nystagmus, no ptosis, subtle right cranial nerve 6 palsy  CN V:  Facial sensation is intact in all 3 divisions bilaterally.  CN VII: Face is symmetric with normal eye closure and smile.  CN VIII: Hearing is normal bilaterally.  CN IX, X: Palate elevates symmetrically.   CN XI: Head turning and shoulder shrug are intact.  CN XII: Tongue is midline with normal movements and no atrophy.    Motor: Muscle bulk and tone are normal. No pronator drift. 4+/5 hip flexion bilaterally, otherwise 5/5 strength throughout    Reflexes: Reflexes are 2+ and symmetric at the biceps, triceps, knees.    Sensory: Intact sensation to light touch in her bilateral upper and lower extremities.     Coordination: Rapid alternating movements and fine finger movements are intact. There is no dysmetria on finger-to-nose and heel-knee-shin. There are no abnormal or extraneous movements.    Gait/Stance: Posture is normal. Gait is unsteady with shortened steps on a narrow base.       ASSESSMENT/PLAN:     Diagnosis/Etiology: Posterior circulation ischemic stroke 2/2 bilateral vertebral artery dissection. Likely from self cervical manipulation.  Stroke Risk Factors: HLD  Effects of Stroke: Mild dysarthria    Recommendations:   - Additional studies: None  - Antiplatelet/Anticoagulation: Continue ASA 81mg daily.   - Lipid Management: Target is LDL < 70mg/dL. Continue atorvastatin 40mg daily.   - Diabetes: Target hemoglobin A1c <7%, measured 2X/year or quarterly if not meeting goals.  - Hypertension: Target systolic BP <130mmHg. At goal today.   - Sleep: No complaints today. Recommend continuing melatonin PRN for sleep.   - Weight: Target BMI is <25kg/m2 if BMI is > 25-27 kg/m2; if BMI >27kg/m2 10% weight loss is suggested over next 6 months. Dietary consult is suggested to assist in weight control.  - Therapy: Continue home physical therapy.   - Follow-up: RTC in 6 months. Recommend close follow-up with their PCP for monitoring and management of the above vascular risk factors as needed to meet goals.     #  Pseudobulbar affect  # Anxiety  # Depression  - Continue prozac and follow-up with her PCP or behavioral health provider      Problem List Items Addressed This Visit          Neuro    Vertebral artery dissection       Psychiatric    Anxiety and depression     Other Visit Diagnoses       History of stroke    -  Primary                Aileen Deluca MD, PhD  [facility]  Department of Vascular Neurology      30 minutes of total time spent on the encounter, which includes face to face time and non-face to face time preparing to see the patient (eg, review of tests), obtaining and/or reviewing separately obtained history, documenting clinical information in the electronic or other health record, independently interpreting results (not separately reported), and communicating results to the patient/family/caregiver, patient/family education, and care coordination (not separately reported).

## 2024-03-18 PROBLEM — Z23 NEED FOR VACCINATION AGAINST STREPTOCOCCUS PNEUMONIAE: Status: RESOLVED | Noted: 2023-02-14 | Resolved: 2024-03-18

## 2024-03-18 PROBLEM — F32.0 CURRENT MILD EPISODE OF MAJOR DEPRESSIVE DISORDER WITHOUT PRIOR EPISODE: Status: ACTIVE | Noted: 2024-03-18

## 2024-03-20 ENCOUNTER — TELEPHONE (OUTPATIENT)
Dept: NEUROLOGY | Facility: CLINIC | Age: 39
End: 2024-03-20
Payer: COMMERCIAL

## 2024-03-21 ENCOUNTER — HOME CARE VISIT (OUTPATIENT)
Dept: NEUROLOGY | Facility: HOSPITAL | Age: 39
End: 2024-03-21
Payer: COMMERCIAL

## 2024-04-05 ENCOUNTER — TELEPHONE (OUTPATIENT)
Dept: NEUROLOGY | Facility: CLINIC | Age: 39
End: 2024-04-05
Payer: COMMERCIAL

## 2024-04-05 NOTE — TELEPHONE ENCOUNTER
----- Message from Selene Ballard sent at 4/5/2024  2:57 PM CDT -----  Regarding: PEER TO PEER  Contact: Bryson rojas/Merrill Roman [phone]  Calling from Merrill Roman on behalf of [employer] to schedule peer to peer. Please call [phone] to schedule peer to peer for long term disability benefits.

## 2024-04-08 ENCOUNTER — TELEPHONE (OUTPATIENT)
Dept: NEUROLOGY | Facility: CLINIC | Age: 39
End: 2024-04-08
Payer: COMMERCIAL

## 2024-04-08 NOTE — TELEPHONE ENCOUNTER
----- Message from Joselin Do sent at 4/8/2024 11:09 AM CDT -----  Regarding: Peer to Peer  Contact: Bryson [phone]  Bryson/ Merrill Roman is calling to schedule a peer to peer for long term disability for pt please call

## 2024-04-09 ENCOUNTER — TELEPHONE (OUTPATIENT)
Dept: NEUROLOGY | Facility: CLINIC | Age: 39
End: 2024-04-09
Payer: COMMERCIAL

## 2024-04-09 NOTE — TELEPHONE ENCOUNTER
----- Message from Tita Ontiveros sent at 4/9/2024 10:12 AM CDT -----   Merrill Roman calling to schedule a peer to peer     Call back [phone] ref# [id number]

## 2024-04-19 ENCOUNTER — TELEPHONE (OUTPATIENT)
Dept: NEUROLOGY | Facility: CLINIC | Age: 39
End: 2024-04-19
Payer: COMMERCIAL

## 2024-04-19 NOTE — TELEPHONE ENCOUNTER
Called Merrill Roman and scheduled peer to peer       Longer than normal wait times. LVM with Direct line to schedule peer to peer for ref# [id number]   Will try to schedule peer to peer with Merrill Abel - [facility] on Monday.           ----- Message from Tita Ontiveros sent at 4/9/2024 10:12 AM CDT -----   Merrill bAel calling to schedule a peer to peer      Call back [phone] ref# [id number]      ----- Message from Joselin Do sent at 4/8/2024 11:09 AM CDT -----  Regarding: Peer to Peer  Contact: Bryson [phone]  Bryson/ Merrill Roman is calling to schedule a peer to peer for long term disability for pt please call            ----- Message from Selene Ballard sent at 4/5/2024  2:57 PM CDT -----  Regarding: PEER TO PEER  Contact: Bryson Gómeze Abel [phone]  Calling from Merrill Abel on behalf of \[facility]\"" to schedule peer to peer. Please call [phone] to schedule peer to peer for long term disability benefits.      ----- Message from Sandie Moise sent at 3/20/2024 12:12 PM CDT -----  Regarding: pt advice  Contact: [phone]  Abbey calling from [facility] in reference clarify if provider was restricting pt working hr to 30hr or if she can be release to 40 hr if pt wanting to. Pls call

## 2024-07-30 ENCOUNTER — OFFICE VISIT (OUTPATIENT)
Dept: NEUROLOGY | Facility: CLINIC | Age: 39
End: 2024-07-30
Payer: COMMERCIAL

## 2024-07-30 VITALS
BODY MASS INDEX: 35.46 KG/M2 | HEIGHT: 61 IN | SYSTOLIC BLOOD PRESSURE: 120 MMHG | DIASTOLIC BLOOD PRESSURE: 85 MMHG | HEART RATE: 83 BPM | WEIGHT: 187.81 LBS

## 2024-07-30 DIAGNOSIS — R41.840 INATTENTION: ICD-10-CM

## 2024-07-30 DIAGNOSIS — I77.74 VERTEBRAL ARTERY DISSECTION: Primary | ICD-10-CM

## 2024-07-30 DIAGNOSIS — Z86.73 HISTORY OF STROKE: ICD-10-CM

## 2024-07-30 PROCEDURE — 99999 PR PBB SHADOW E&M-EST. PATIENT-LVL III: CPT | Mod: PBBFAC,,, | Performed by: STUDENT IN AN ORGANIZED HEALTH CARE EDUCATION/TRAINING PROGRAM

## 2024-07-30 PROCEDURE — 99213 OFFICE O/P EST LOW 20 MIN: CPT | Mod: S$GLB,,, | Performed by: STUDENT IN AN ORGANIZED HEALTH CARE EDUCATION/TRAINING PROGRAM

## 2024-07-30 PROCEDURE — 3074F SYST BP LT 130 MM HG: CPT | Mod: CPTII,S$GLB,, | Performed by: STUDENT IN AN ORGANIZED HEALTH CARE EDUCATION/TRAINING PROGRAM

## 2024-07-30 PROCEDURE — 3008F BODY MASS INDEX DOCD: CPT | Mod: CPTII,S$GLB,, | Performed by: STUDENT IN AN ORGANIZED HEALTH CARE EDUCATION/TRAINING PROGRAM

## 2024-07-30 PROCEDURE — 1159F MED LIST DOCD IN RCRD: CPT | Mod: CPTII,S$GLB,, | Performed by: STUDENT IN AN ORGANIZED HEALTH CARE EDUCATION/TRAINING PROGRAM

## 2024-07-30 PROCEDURE — 3079F DIAST BP 80-89 MM HG: CPT | Mod: CPTII,S$GLB,, | Performed by: STUDENT IN AN ORGANIZED HEALTH CARE EDUCATION/TRAINING PROGRAM

## 2024-07-30 NOTE — PROGRESS NOTES
Vascular Neurology Clinic  Return Clinic Visit    Patient Name: Madeline Carlton Ma  MRN: [id number]  Date: 7/30/24  Referring Provider: No ref. provider found    CC: Ischemic stroke    SUBJECTIVE:      History of Present Illness: Madeline Carlton Ma is a 39 y.o. female with a past medical history significant for migraine headaches who presents to clinic for follow-up for stroke.     She initially presented to [facility] with slurred speech, fatigue, bilateral ptosis, and tongue numbness after doing a cervical manipulation to her neck at home. She presented within the therapeutic window for IV thrombolysis; however, given abnormal findings on CTH (with concern for possible intracranial mass), thrombolysis was deferred. [facility] did not have capability for CTA, so she was transferred to [facility] for additional work-up. CTA head/neck showed bilateral vertebral artery dissections. MRI brain revealed small foci of restricted diffusion in the bilateral cerebellar hemispheres and the right occipital lobe. Of note, two weeks prior to her presentation, she had headache with aura. CTH at that time showed a hypodensity in the left cerebellum, which likely represented infarction.     She was admitted to the VN service from 12/25-12/30/22. Etiology of her strokes were secondary to vertebral artery dissections from high-velocity self neck adjustment. Her symptoms improved over the course of the admission and she was discharged to [facility] on ASA 81mg and atorvastatin. Pseudobulbar affect was noted.      Post-hospital course was c/b pseudobulbar affect and anxiety/depression, now stable on fluoxetine. S/p outpatient PT/OT/SLP.     Interval History:   She works as a  part-time, up to 30 hours a week, but experiences significant fatigue, cognitive fogging with increased work load. Has had to take on reduced work and is concerned about job security at this point. Reports inattention and feeling overwhelmed. She  is also continuing home physical therapy. Walks on a treadmill up to 6 days per week. Eating well. No difficulties with sleep. No currently depressed mood or anxiety.      Etiology: Posterior circulation strokes 2/2 bilateral vertebral artery dissections from high-velocity self neck manipulations  Intervention: None    Work-up:    Imaging:  - [facility] (12/21/2022): Wedge-shaped 2.2 x 1.0 by 0.8 cm focus in the left cerebellum laterally with 2nd similar focus 0.5 cm similar focus ventral to it both questionable minimal patchy enhancement versus just a blood vessels passing through/around them. These foci are of unknown etiology, but differential considerations include encephalomalacia from chronic infarctions with the without some patchy enhancement from luxury perfusion, sequela from remote trauma, and benign and malignant masses. Recommend an MRI of the brain with and without contrast for further evaluation.    - CTA head and neck (12/25/2022):  1.  Abnormal segment of focal caliber change with luminal narrowing and irregularity involving the V2 segment of the left vertebral artery.  Additionally, there is focal luminal narrowing and irregularity involving the V3 segment of the right vertebral artery.  Findings are concerning for possible bilateral vertebral artery dissection.  Vascular neurology consultation advised.  Further assessment could be performed with conventional angiography as warranted.  2.  Persistent asymmetric wedge-shaped region of parenchymal hypoattenuation within the left cerebellar hemisphere.  There is apparent contrast enhancement on the delayed series.  This is nonspecific although may relate to subacute infarct.  More sensitive assessment for ischemic could be performed with MRI if there are no patient contraindications.   3.  No evidence of cervical spine fracture.  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     - MRI brain without contrast (12/25/2022):  1.  Several  apparent small subtle foci of restricted diffusion within the bilateral cerebellar hemispheres as well as additional punctate focus within the paramedian posterior right occipital lobe suspicious for punctate acute infarcts.  There is subtle asymmetric diffusion hyperintensity within the left lara lynn and additional region of acute ischemia not excluded.  Further evaluation and follow-up as warranted.  2.  Apparent small region of encephalomalacia within the left cerebellar hemisphere which may represent a remote infarct.  Additional possible punctate lacunar type infarct within the right superior cerebellar hemisphere.  3.  Abnormal T2 flow void involving the V3 segment of the right vertebral artery in this patient with suspected vertebral artery dissection.  4.  Several scattered foci of periventricular T2/FLAIR hyperintensity.  These lesions are nonspecific in appearance and may be seen with accelerated small vessel ischemic disease, vasculopathies, migraine headaches, and as the residua from inflammatory and traumatic insults to the brain.      Cardiac Evaluation:  - TTE (12/25/2022)  The left ventricle is normal in size with normal systolic function.  The estimated ejection fraction is 65%.  Normal left ventricular diastolic function.  Normal right ventricular size with normal right ventricular systolic function.  Normal central venous pressure (3 mmHg).  The estimated PA systolic pressure is 27 mmHg.    - Cardiac monitor: No atrial arrhythmias identified when monitored in the inpatient setting    Labs:  Recent Labs   Lab 12/25/22  0346 03/18/24  0847   Hemoglobin A1C 4.8  --    LDL Cholesterol 109.4 44.4 L   HDL 56 63   Triglycerides 68 103   Cholesterol 179 128         Review of Systems: The following systems were reviewed with pertinent positives and negatives documented in the HPI: constitutional, eyes, CV, respiratory, GI, , musculoskeletal, skin, neurological, psychiatric    Past Medical History:  Past  Medical History:   Diagnosis Date    Migraine headache     Ptosis, bilateral 2022       Medications:    Current Outpatient Medications:     atorvastatin (LIPITOR) 40 MG tablet, TAKE 1 TABLET BY MOUTH EVERY DAY, Disp: 90 tablet, Rfl: 2    FLUoxetine 10 MG Tab, TAKE 1 TABLET BY MOUTH EVERY DAY, Disp: 90 tablet, Rfl: 2    aspirin (ECOTRIN) 81 MG EC tablet, Take 1 tablet (81 mg total) by mouth once daily., Disp: 360 tablet, Rfl: 0  Any other notable medications as documented in HPI.    Allergies:  Review of patient's allergies indicates:   Allergen Reactions    Promethazine Other (See Comments)     Nervous reaction    Nickel sutures [surgical stainless steel] Rash       Social History:  Social History     Socioeconomic History    Marital status:    Tobacco Use    Smoking status: Former     Current packs/day: 0.00     Types: Cigarettes     Quit date: 2013     Years since quittin.0    Smokeless tobacco: Never   Substance and Sexual Activity    Alcohol use: Yes     Alcohol/week: 6.0 standard drinks of alcohol     Types: 6 Glasses of wine per week     Comment: 2 time per week    Drug use: No    Sexual activity: Yes     Partners: Male     Birth control/protection: See Surgical Hx     Comment: Tubal removal 2017     Social Determinants of Health     Financial Resource Strain: Low Risk  (2023)    Overall Financial Resource Strain (CARDIA)     Difficulty of Paying Living Expenses: Not hard at all   Food Insecurity: No Food Insecurity (2023)    Hunger Vital Sign     Worried About Running Out of Food in the Last Year: Never true     Ran Out of Food in the Last Year: Never true   Transportation Needs: No Transportation Needs (2023)    PRAPARE - Transportation     Lack of Transportation (Medical): No     Lack of Transportation (Non-Medical): No   Physical Activity: Unknown (2023)    Exercise Vital Sign     Days of Exercise per Week: Patient declined   Recent Concern: Physical Activity -  Insufficiently Active (12/20/2022)    Exercise Vital Sign     Days of Exercise per Week: 6 days     Minutes of Exercise per Session: 20 min   Stress: No Stress Concern Present (2/12/2023)    [employer] [place] [employer] - Occupational Stress Questionnaire     Feeling of Stress : Not at all   Housing Stability: Low Risk  (2/12/2023)    Housing Stability Vital Sign     Unable to Pay for Housing in the Last Year: No     Number of Places Lived in the Last Year: 1     Unstable Housing in the Last Year: No     Any other notable Social History as documented in HPI.    Family History:  Family History   Problem Relation Name Age of Onset    Ovarian cancer Mother  34    Hyperlipidemia Father      Ovarian cancer Maternal Aunt          age unknown    Breast cancer Paternal Grandmother          50's     Any other notable FMH as documented in HPI.      OBJECTIVE:     Physical Exam:   Vitals:    07/30/24 0934   BP: 120/85   Pulse: 83       GEN: NAD, pleasant, cooperative  CV: RRR  PULM: No signs of resp distress, on room air  EXT: No cyanosis, clubbing, or edema.    NEURO:  Mental status: The patient is alert, attentive, and oriented to self, age, month, year, situation. Pseudobulbar affect - becomes tearful when discussing stroke care/medications.   Speech: Slow prosody and forced intonation with intact repetition, comprehension, and naming. Speech is dysarthric, but intelligible.     Cranial nerves:  CN II: Visual fields are full to confrontation. Pupils are 3mm and reactive to light OU.  CN III, IV, VI: EOMI OS, no nystagmus, no ptosis, subtle right cranial nerve 6 palsy  CN V: Facial sensation is intact in all 3 divisions bilaterally.  CN VII: Face is symmetric with normal eye closure and smile.  CN VIII: Hearing is normal bilaterally.  CN IX, X: Palate elevates symmetrically.   CN XI: Head turning and shoulder shrug are intact.  CN XII: Tongue is midline with normal movements and no atrophy.    Motor: Muscle bulk  and tone are normal. No pronator drift. 4+/5 hip flexion bilaterally, otherwise 5/5 strength throughout    Reflexes: Reflexes are 2+ and symmetric at the biceps, triceps, knees.    Sensory: Intact sensation to light touch in her bilateral upper and lower extremities.     Coordination: Rapid alternating movements and fine finger movements are intact. There is no dysmetria on finger-to-nose and heel-knee-shin. There are no abnormal or extraneous movements.    Gait/Stance: Posture is normal. Gait is unsteady with shortened steps on a narrow base.       ASSESSMENT/PLAN:     Diagnosis/Etiology: Posterior circulation ischemic stroke 2/2 bilateral vertebral artery dissection 2/2 self cervical manipulation.  Stroke Risk Factors: HLD  Effects of Stroke: Mild dysarthria    Recommendations:   - Additional studies: None  - Antiplatelet/Anticoagulation: Continue ASA 81mg daily.   - Lipid Management: Target is LDL < 70mg/dL. Continue atorvastatin 40mg daily.   - Diabetes: Target hemoglobin A1c <7%, measured 2X/year or quarterly if not meeting goals.  - Hypertension: Target systolic BP <130mmHg. At goal today.   - Sleep: No complaints today. Recommend continuing melatonin PRN for sleep.   - Weight: Target BMI is <25kg/m2 if BMI is > 25-27 kg/m2; if BMI >27kg/m2 10% weight loss is suggested over next 6 months. Dietary consult is suggested to assist in weight control.  - Therapy: Continue home physical therapy.   - Follow-up: RTC in 6 months. Recommend close follow-up with their PCP for monitoring and management of the above vascular risk factors as needed to meet goals.     # Anxiety  # Depression  # Inattention   - Well-controlled overall. Discussed with the patient about possible effects of her SSRI and decreased motivation with work tasks. Given the low dose fluoxetine, plan to reduce dose for a couple of weeks and then discontinue to assess whether this medication is affecting her ability to work. She will message with any  concerns, increased depression mood, SI/HI/AVH.   - Referral to psychiatry sent for evaluation/medication management.         Problem List Items Addressed This Visit          Neuro    Vertebral artery dissection - Primary     Other Visit Diagnoses       History of stroke        Inattention        Relevant Orders    Ambulatory referral/consult to Psychiatry            Aileen Deluca MD, PhD  [facility]  Department of Vascular Neurology

## 2024-10-28 ENCOUNTER — PATIENT MESSAGE (OUTPATIENT)
Dept: NEUROLOGY | Facility: CLINIC | Age: 39
End: 2024-10-28
Payer: COMMERCIAL

## 2024-11-12 ENCOUNTER — OFFICE VISIT (OUTPATIENT)
Dept: NEUROLOGY | Facility: CLINIC | Age: 39
End: 2024-11-12
Payer: COMMERCIAL

## 2024-11-12 VITALS — HEART RATE: 65 BPM | DIASTOLIC BLOOD PRESSURE: 86 MMHG | SYSTOLIC BLOOD PRESSURE: 124 MMHG

## 2024-11-12 DIAGNOSIS — R41.840 COGNITIVE ATTENTION DEFICIT: Primary | ICD-10-CM

## 2024-11-12 PROCEDURE — 99999 PR PBB SHADOW E&M-EST. PATIENT-LVL III: CPT | Mod: PBBFAC,,, | Performed by: STUDENT IN AN ORGANIZED HEALTH CARE EDUCATION/TRAINING PROGRAM

## 2024-11-12 PROCEDURE — 3074F SYST BP LT 130 MM HG: CPT | Mod: CPTII,S$GLB,, | Performed by: STUDENT IN AN ORGANIZED HEALTH CARE EDUCATION/TRAINING PROGRAM

## 2024-11-12 PROCEDURE — 99213 OFFICE O/P EST LOW 20 MIN: CPT | Mod: S$GLB,,, | Performed by: STUDENT IN AN ORGANIZED HEALTH CARE EDUCATION/TRAINING PROGRAM

## 2024-11-12 PROCEDURE — 3079F DIAST BP 80-89 MM HG: CPT | Mod: CPTII,S$GLB,, | Performed by: STUDENT IN AN ORGANIZED HEALTH CARE EDUCATION/TRAINING PROGRAM

## 2024-11-12 PROCEDURE — 1159F MED LIST DOCD IN RCRD: CPT | Mod: CPTII,S$GLB,, | Performed by: STUDENT IN AN ORGANIZED HEALTH CARE EDUCATION/TRAINING PROGRAM

## 2024-11-12 NOTE — PROGRESS NOTES
Vascular Neurology Clinic  Return Clinic Visit    Patient Name: Madeline Carlton Ma  MRN: [id number]  Date: 11/12/24  Referring Provider: No ref. provider found    CC: Ischemic stroke    SUBJECTIVE:      History of Present Illness: Madeline Carlton Ma is a 39 y.o. female with a past medical history significant for migraine headaches who presents to clinic for follow-up for stroke.     She initially presented to [facility] with slurred speech, fatigue, bilateral ptosis, and tongue numbness after doing a cervical manipulation to her neck at home. She presented within the therapeutic window for IV thrombolysis; however, given abnormal findings on CTH (with concern for possible intracranial mass), thrombolysis was deferred. [facility] did not have capability for CTA, so she was transferred to [facility] for additional work-up. CTA head/neck showed bilateral vertebral artery dissections. MRI brain revealed small foci of restricted diffusion in the bilateral cerebellar hemispheres and the right occipital lobe. Of note, two weeks prior to her presentation, she had headache with aura. CTH at that time showed a hypodensity in the left cerebellum, which likely represented infarction.     She was admitted to the VN service from 12/25-12/30/22. Etiology of her strokes were secondary to vertebral artery dissections from high-velocity self neck adjustment. Her symptoms improved over the course of the admission and she was discharged to [facility] on ASA 81mg and atorvastatin. Pseudobulbar affect was noted.      Post-hospital course was c/b pseudobulbar affect and anxiety/depression, now stable on fluoxetine. S/p outpatient PT/OT/SLP.     Interval History:   She works as a  part-time, up to 30 hours a week, but experiences significant fatigue, cognitive fogging with increased work load. Has had to take on reduced work and is concerned about job security. Reports inattention, difficulty with multi-tasking, and  feeling overwhelmed. She has since discontinued fluoxetine, which helped to improve her feelings of fatigue. She is trying to find a home psychiatrist for evaluation/treatment for possible attention deficit disorder, but there were concerns with her history of stroke.     She is also continuing home physical therapy. Walks on a treadmill up to 6 days per week. Eating well. No difficulties with sleep. No currently depressed mood or anxiety.      Etiology: Posterior circulation strokes 2/2 bilateral vertebral artery dissections from high-velocity self neck manipulations  Intervention: None    Work-up:    Imaging:  - [facility] (12/21/2022): Wedge-shaped 2.2 x 1.0 by 0.8 cm focus in the left cerebellum laterally with 2nd similar focus 0.5 cm similar focus ventral to it both questionable minimal patchy enhancement versus just a blood vessels passing through/around them. These foci are of unknown etiology, but differential considerations include encephalomalacia from chronic infarctions with the without some patchy enhancement from luxury perfusion, sequela from remote trauma, and benign and malignant masses. Recommend an MRI of the brain with and without contrast for further evaluation.    - CTA head and neck (12/25/2022):  1.  Abnormal segment of focal caliber change with luminal narrowing and irregularity involving the V2 segment of the left vertebral artery.  Additionally, there is focal luminal narrowing and irregularity involving the V3 segment of the right vertebral artery.  Findings are concerning for possible bilateral vertebral artery dissection.  Vascular neurology consultation advised.  Further assessment could be performed with conventional angiography as warranted.  2.  Persistent asymmetric wedge-shaped region of parenchymal hypoattenuation within the left cerebellar hemisphere.  There is apparent contrast enhancement on the delayed series.  This is nonspecific although may relate to subacute infarct.  More  sensitive assessment for ischemic could be performed with MRI if there are no patient contraindications.   3.  No evidence of cervical spine fracture.  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     - MRI brain without contrast (12/25/2022):  1.  Several apparent small subtle foci of restricted diffusion within the bilateral cerebellar hemispheres as well as additional punctate focus within the paramedian posterior right occipital lobe suspicious for punctate acute infarcts.  There is subtle asymmetric diffusion hyperintensity within the left lara lynn and additional region of acute ischemia not excluded.  Further evaluation and follow-up as warranted.  2.  Apparent small region of encephalomalacia within the left cerebellar hemisphere which may represent a remote infarct.  Additional possible punctate lacunar type infarct within the right superior cerebellar hemisphere.  3.  Abnormal T2 flow void involving the V3 segment of the right vertebral artery in this patient with suspected vertebral artery dissection.  4.  Several scattered foci of periventricular T2/FLAIR hyperintensity.  These lesions are nonspecific in appearance and may be seen with accelerated small vessel ischemic disease, vasculopathies, migraine headaches, and as the residua from inflammatory and traumatic insults to the brain.      Cardiac Evaluation:  - TTE (12/25/2022)  The left ventricle is normal in size with normal systolic function.  The estimated ejection fraction is 65%.  Normal left ventricular diastolic function.  Normal right ventricular size with normal right ventricular systolic function.  Normal central venous pressure (3 mmHg).  The estimated PA systolic pressure is 27 mmHg.    - Cardiac monitor: No atrial arrhythmias identified when monitored in the inpatient setting    Labs:  Recent Labs   Lab 12/25/22  0346 03/18/24  0847   Hemoglobin A1C 4.8  --    LDL Cholesterol 109.4 44.4 L   HDL 56 63   Triglycerides 68  103   Cholesterol 179 128         Review of Systems: The following systems were reviewed with pertinent positives and negatives documented in the HPI: constitutional, eyes, CV, respiratory, GI, , musculoskeletal, skin, neurological, psychiatric    Past Medical History:  Past Medical History:   Diagnosis Date    Migraine headache     Ptosis, bilateral 2022       Medications:    Current Outpatient Medications:     aspirin (ECOTRIN) 81 MG EC tablet, Take 1 tablet (81 mg total) by mouth once daily., Disp: 360 tablet, Rfl: 0    atorvastatin (LIPITOR) 40 MG tablet, Take 1 tablet (40 mg total) by mouth every evening., Disp: 90 tablet, Rfl: 2  Any other notable medications as documented in HPI.    Allergies:  Review of patient's allergies indicates:   Allergen Reactions    Promethazine Other (See Comments)     Nervous reaction    Nickel sutures [surgical stainless steel] Rash       Social History:  Social History     Socioeconomic History    Marital status:    Tobacco Use    Smoking status: Former     Current packs/day: 0.00     Types: Cigarettes     Quit date: 2013     Years since quittin.2    Smokeless tobacco: Never   Substance and Sexual Activity    Alcohol use: Yes     Alcohol/week: 6.0 standard drinks of alcohol     Types: 6 Glasses of wine per week     Comment: 2 time per week    Drug use: No    Sexual activity: Yes     Partners: Male     Birth control/protection: See Surgical Hx     Comment: Tubal removal 2017     Social Drivers of Health     Financial Resource Strain: Low Risk  (2023)    Overall Financial Resource Strain (CARDIA)     Difficulty of Paying Living Expenses: Not hard at all   Food Insecurity: No Food Insecurity (2023)    Hunger Vital Sign     Worried About Running Out of Food in the Last Year: Never true     Ran Out of Food in the Last Year: Never true   Transportation Needs: No Transportation Needs (2023)    PRAPARE - Transportation     Lack of Transportation  (Medical): No     Lack of Transportation (Non-Medical): No   Physical Activity: Unknown (2/12/2023)    Exercise Vital Sign     Days of Exercise per Week: Patient declined   Recent Concern: Physical Activity - Insufficiently Active (12/20/2022)    Exercise Vital Sign     Days of Exercise per Week: 6 days     Minutes of Exercise per Session: 20 min   Stress: No Stress Concern Present (2/12/2023)    [employer] [place] [employer] - Occupational Stress Questionnaire     Feeling of Stress : Not at all   Housing Stability: Low Risk  (2/12/2023)    Housing Stability Vital Sign     Unable to Pay for Housing in the Last Year: No     Number of Places Lived in the Last Year: 1     Unstable Housing in the Last Year: No     Any other notable Social History as documented in HPI.    Family History:  Family History   Problem Relation Name Age of Onset    Ovarian cancer Mother  34    Hyperlipidemia Father      Ovarian cancer Maternal Aunt          age unknown    Breast cancer Paternal Grandmother          50's     Any other notable FMH as documented in HPI.      OBJECTIVE:     Physical Exam:   Vitals:    11/12/24 0922   BP: 124/86   Pulse: 65       GEN: NAD, pleasant, cooperative  CV: RRR  PULM: No signs of resp distress, on room air  EXT: No cyanosis, clubbing, or edema.    NEURO:  Mental status: The patient is alert, attentive, and oriented to self, age, month, year, situation. Pseudobulbar affect - becomes tearful when discussing stroke care/medications.   Speech: Slow prosody and forced intonation with intact repetition, comprehension, and naming. Speech is dysarthric, but intelligible.     Cranial nerves:  CN II: Visual fields are full to confrontation. Pupils are 3mm and reactive to light OU.  CN III, IV, VI: EOMI OS, no nystagmus, no ptosis, subtle right cranial nerve 6 palsy  CN V: Facial sensation is intact in all 3 divisions bilaterally.  CN VII: Face is symmetric with normal eye closure and smile.  CN VIII:  Hearing is normal bilaterally.  CN IX, X: Palate elevates symmetrically.   CN XI: Head turning and shoulder shrug are intact.  CN XII: Tongue is midline with normal movements and no atrophy.    Motor: Muscle bulk and tone are normal. No pronator drift. 4+/5 hip flexion bilaterally, otherwise 5/5 strength throughout    Reflexes: Reflexes are 2+ and symmetric at the biceps, triceps, knees.    Sensory: Intact sensation to light touch in her bilateral upper and lower extremities.     Coordination: Rapid alternating movements and fine finger movements are intact. There is no dysmetria on finger-to-nose and heel-knee-shin. There are no abnormal or extraneous movements.    Gait/Stance: Posture is normal. Gait is unsteady with shortened steps on a narrow base.       ASSESSMENT/PLAN:     Diagnosis/Etiology: Posterior circulation ischemic stroke 2/2 bilateral vertebral artery dissection 2/2 self cervical manipulation.  Stroke Risk Factors: HLD  Effects of Stroke: Mild dysarthria    Recommendations:   - Additional studies: Recommend follow-up with psychiatry and neuropsychology.   - Antiplatelet/Anticoagulation: Continue ASA 81mg daily.   - Lipid Management: Target is LDL < 70mg/dL. Continue atorvastatin 40mg daily.   - Diabetes: Target hemoglobin A1c <7%, measured 2X/year or quarterly if not meeting goals.  - Hypertension: Target systolic BP <130mmHg. At goal today.   - Sleep: No complaints today. Recommend continuing melatonin PRN for sleep.   - Weight: Target BMI is <25kg/m2 if BMI is > 25-27 kg/m2; if BMI >27kg/m2 10% weight loss is suggested over next 6 months. Dietary consult is suggested to assist in weight control.  - Therapy: Continue physical therapy.   - Follow-up: RTC in 6 months. Recommend close follow-up with their PCP for monitoring and management of the above vascular risk factors as needed to meet goals.     # Anxiety  # Depression  # Inattention   - Referral to psychiatry and neuropsychology sent for  evaluation/medication management for the above. Given the mechanical etiology of her stroke (dissection due to cervical neck manipulation), there is no additional risk to starting medications for attention deficit disorder from a vascular neurology/stroke standpoint. Recommend discussion of risk/benefit with her psychiatrist, and if indicated, recommend initiation of treatment.       Problem List Items Addressed This Visit    None  Visit Diagnoses       Cognitive attention deficit    -  Primary    Relevant Orders    Ambulatory referral/consult to Adult Neuropsychology              Aileen Deluca MD, PhD  [facility]  Department of Vascular Neurology

## 2024-12-06 ENCOUNTER — PATIENT MESSAGE (OUTPATIENT)
Dept: NEUROLOGY | Facility: CLINIC | Age: 39
End: 2024-12-06
Payer: COMMERCIAL
